# Patient Record
Sex: MALE | Race: WHITE | Employment: OTHER | URBAN - METROPOLITAN AREA
[De-identification: names, ages, dates, MRNs, and addresses within clinical notes are randomized per-mention and may not be internally consistent; named-entity substitution may affect disease eponyms.]

---

## 2017-08-15 ENCOUNTER — OFFICE VISIT (OUTPATIENT)
Dept: FAMILY MEDICINE | Facility: CLINIC | Age: 42
End: 2017-08-15
Payer: COMMERCIAL

## 2017-08-15 VITALS
DIASTOLIC BLOOD PRESSURE: 74 MMHG | OXYGEN SATURATION: 99 % | BODY MASS INDEX: 27.77 KG/M2 | WEIGHT: 205 LBS | SYSTOLIC BLOOD PRESSURE: 130 MMHG | HEART RATE: 120 BPM | TEMPERATURE: 97.6 F | RESPIRATION RATE: 20 BRPM | HEIGHT: 72 IN

## 2017-08-15 DIAGNOSIS — Z23 NEED FOR PROPHYLACTIC VACCINATION WITH TETANUS-DIPHTHERIA (TD): ICD-10-CM

## 2017-08-15 DIAGNOSIS — Z11.3 SCREEN FOR STD (SEXUALLY TRANSMITTED DISEASE): ICD-10-CM

## 2017-08-15 DIAGNOSIS — F31.81 BIPOLAR 2 DISORDER (H): ICD-10-CM

## 2017-08-15 DIAGNOSIS — Z00.00 ROUTINE GENERAL MEDICAL EXAMINATION AT A HEALTH CARE FACILITY: Primary | ICD-10-CM

## 2017-08-15 PROCEDURE — 90471 IMMUNIZATION ADMIN: CPT | Performed by: NURSE PRACTITIONER

## 2017-08-15 PROCEDURE — 80061 LIPID PANEL: CPT | Performed by: NURSE PRACTITIONER

## 2017-08-15 PROCEDURE — 36415 COLL VENOUS BLD VENIPUNCTURE: CPT | Performed by: NURSE PRACTITIONER

## 2017-08-15 PROCEDURE — 87591 N.GONORRHOEAE DNA AMP PROB: CPT | Performed by: NURSE PRACTITIONER

## 2017-08-15 PROCEDURE — 86803 HEPATITIS C AB TEST: CPT | Performed by: NURSE PRACTITIONER

## 2017-08-15 PROCEDURE — 90715 TDAP VACCINE 7 YRS/> IM: CPT | Performed by: NURSE PRACTITIONER

## 2017-08-15 PROCEDURE — 99386 PREV VISIT NEW AGE 40-64: CPT | Mod: 25 | Performed by: NURSE PRACTITIONER

## 2017-08-15 PROCEDURE — 87491 CHLMYD TRACH DNA AMP PROBE: CPT | Performed by: NURSE PRACTITIONER

## 2017-08-15 PROCEDURE — 87389 HIV-1 AG W/HIV-1&-2 AB AG IA: CPT | Performed by: NURSE PRACTITIONER

## 2017-08-15 PROCEDURE — 99213 OFFICE O/P EST LOW 20 MIN: CPT | Mod: 25 | Performed by: NURSE PRACTITIONER

## 2017-08-15 PROCEDURE — 80053 COMPREHEN METABOLIC PANEL: CPT | Performed by: NURSE PRACTITIONER

## 2017-08-15 PROCEDURE — 86780 TREPONEMA PALLIDUM: CPT | Performed by: NURSE PRACTITIONER

## 2017-08-15 RX ORDER — LAMOTRIGINE 200 MG/1
300 TABLET ORAL DAILY
Qty: 135 TABLET | Refills: 1 | Status: SHIPPED | OUTPATIENT
Start: 2017-08-15 | End: 2018-04-19

## 2017-08-15 RX ORDER — BUPROPION HYDROCHLORIDE 300 MG/1
TABLET ORAL
Refills: 2 | COMMUNITY
Start: 2017-07-05 | End: 2017-08-15

## 2017-08-15 RX ORDER — BUPROPION HYDROCHLORIDE 300 MG/1
300 TABLET ORAL EVERY MORNING
Qty: 90 TABLET | Refills: 1 | Status: SHIPPED | OUTPATIENT
Start: 2017-08-15 | End: 2018-04-03

## 2017-08-15 NOTE — PROGRESS NOTES
SUBJECTIVE:   CC: Shawn Weiler is an 41 year old male who presents for preventative health visit.     Physical   Annual:     Getting at least 3 servings of Calcium per day::  Yes    Bi-annual eye exam::  NO    Dental care twice a year::  Yes    Sleep apnea or symptoms of sleep apnea::  Excessive snoring    Diet::  Regular (no restrictions)    Taking medications regularly::  Yes    Medication side effects::  None    Additional concerns today::  YES      Today's PHQ-2 Score:   PHQ-2 ( 1999 Pfizer) 8/15/2017   Q1: Little interest or pleasure in doing things 0   Q2: Feeling down, depressed or hopeless 0   PHQ-2 Score 0   Q1: Little interest or pleasure in doing things Not at all   Q2: Feeling down, depressed or hopeless Not at all   PHQ-2 Score 0     Abuse: Current or Past(Physical, Sexual or Emotional)- No  Do you feel safe in your environment - No    Social History   Substance Use Topics     Smoking status: Never Smoker     Smokeless tobacco: Never Used     Alcohol use Yes     The patient does not drink >3 drinks per day nor >7 drinks per week.    Last PSA: No results found for: PSA    Reviewed orders with patient. Reviewed health maintenance and updated orders accordingly - Yes  Labs reviewed in EPIC  BP Readings from Last 3 Encounters:   08/15/17 130/74    Wt Readings from Last 3 Encounters:   08/15/17 205 lb (93 kg)            Patient Active Problem List   Diagnosis     Bipolar 2 disorder (H)     History reviewed. No pertinent surgical history.    Social History   Substance Use Topics     Smoking status: Never Smoker     Smokeless tobacco: Never Used     Alcohol use Yes     History reviewed. No pertinent family history.      Current Outpatient Prescriptions   Medication Sig Dispense Refill     LAMOTRIGINE PO Take 300 mg by mouth       buPROPion (WELLBUTRIN XL) 300 MG 24 hr tablet Take 1 tablet (300 mg) by mouth every morning 90 tablet 1     lamoTRIgine (LAMICTAL) 200 MG tablet Take 1.5 tablets (300 mg) by mouth  "daily 135 tablet 1     No Known Allergies  No lab results found.     Reviewed and updated as needed this visit by clinical staff  Tobacco  Allergies  Med Hx  Surg Hx  Fam Hx  Soc Hx        Reviewed and updated as needed this visit by Provider          Bipolar disorder.  He has been on lamotrigine and wellbutrin for years.  He has been stable for several years.  He has a history of going into manic state with his bipolar disorder. He reports that he is fully aware of uncontrolled bipolar signs and symptoms.  Previously lived in [place], [facility] here.   He is requesting medication refills today.    He is in a monogamous relationship.  He is engaged to be  in February, Cathleen Cox.  He has not been screened for sexually transmitted diseases \"in a very long time.\"  He feels he is low risk for sexually transmitted diseases, but is considering sexually transmitted disease panel for routine screening and peace of mind.    He works in construction, resting.  He has a very active job.  His weight is up slightly, and he knows he could bring this down a little.    ROS:  C: NEGATIVE for fever, chills, change in weight  I: NEGATIVE for worrisome rashes, moles or lesions  E: NEGATIVE for vision changes or irritation  ENT: NEGATIVE for ear, mouth and throat problems  R: NEGATIVE for significant cough or SOB  CV: NEGATIVE for chest pain, palpitations or peripheral edema  GI: NEGATIVE for nausea, abdominal pain, heartburn, or change in bowel habits   male: negative for dysuria, hematuria, decreased urinary stream, erectile dysfunction, urethral discharge  M: NEGATIVE for significant arthralgias or myalgia  N: NEGATIVE for weakness, dizziness or paresthesias  E: NEGATIVE for temperature intolerance, skin/hair changes  P: NEGATIVE for changes in mood or affect    OBJECTIVE:   /74  Pulse 120  Temp 97.6  F (36.4  C) (Oral)  Resp 20  Ht 6' (1.829 m)  Wt 205 lb (93 kg)  SpO2 99%  BMI 27.8 kg/m2    EXAM:  GENERAL: " healthy, alert and no distress  EYES: Eyes grossly normal to inspection, PERRL and conjunctivae and sclerae normal  HENT: ear canals and TM's normal, nose and mouth without ulcers or lesions  NECK: no adenopathy, no asymmetry, masses, or scars and thyroid normal to palpation  RESP: lungs clear to auscultation - no rales, rhonchi or wheezes  CV: regular rate and rhythm, normal S1 S2, no S3 or S4, no murmur, click or rub, no peripheral edema and peripheral pulses strong  ABDOMEN: soft, nontender, no hepatosplenomegaly, no masses and bowel sounds normal   (male): normal male genitalia without lesions or urethral discharge, no hernia  MS: no gross musculoskeletal defects noted, no edema  SKIN: no suspicious lesions or rashes  NEURO: Normal strength and tone, mentation intact and speech normal  PSYCH: mentation appears normal, affect normal/bright    ASSESSMENT/PLAN:   (Z00.00) Routine general medical examination at a health care facility  (primary encounter diagnosis)  Comment: Routine  Plan: Lipid panel reflex to direct LDL, TDAP VACCINE         (ADACEL), VACCINE ADMINISTRATION, INITIAL,         Comprehensive metabolic panel            (F31.81) Bipolar 2 disorder (H)  Comment: Stable  Plan: Comprehensive metabolic panel, buPROPion         (WELLBUTRIN XL) 300 MG 24 hr tablet,         lamoTRIgine (LAMICTAL) 200 MG tablet        Per the patient's stated history, medication history, and his stability at this time, I did go ahead and refill his medications today. I discussed with him that I would be agreeable to continuing his medication refills. I would ask that he comes into the clinic every 6 months to see me for med check appointments. If at any time he develops worsening symptoms of bipolar disorder, I would ask him to follow-up with psychiatry.  The patient denies psychology/counseling necessity or referral today.      (Z23) Need for prophylactic vaccination with tetanus-diphtheria (TD)  Comment:   Plan:  Given    (Z11.3) Screen for STD (sexually transmitted disease)  Comment:   Plan: Chlamydia trachomatis PCR, Neisseria         gonorrhoeae PCR, Anti Treponema, Hepatitis C         antibody, HIV Antigen Antibody Combo        Routine        COUNSELING:   Reviewed preventive health counseling, as reflected in patient instructions       reports that he has never smoked. He has never used smokeless tobacco.      Estimated body mass index is 27.8 kg/(m^2) as calculated from the following:    Height as of this encounter: 6' (1.829 m).    Weight as of this encounter: 205 lb (93 kg).   Weight management plan: Discussed healthy diet and exercise guidelines and patient will follow up in 12 months in clinic to re-evaluate.    Counseling Resources:  ATP IV Guidelines  Pooled Cohorts Equation Calculator  FRAX Risk Assessment  ICSI Preventive Guidelines  Dietary Guidelines for Americans, 2010  USDA's MyPlate  ASA Prophylaxis  Lung CA Screening    ALEYDA Roberts [facility]  Answers for HPI/ROS submitted by the patient on 8/15/2017   PHQ-2 Score: 0

## 2017-08-15 NOTE — MR AVS SNAPSHOT
After Visit Summary   8/15/2017    Shawn Weiler    MRN: [id number]           Patient Information     Date Of Birth          1975        Visit Information        Provider Department      8/15/2017 2:40 PM Gabby Minaya APRN [facility]        Today's Diagnoses     Routine general medical examination at a health care facility    -  1    Bipolar 2 disorder (H)        Need for prophylactic vaccination with tetanus-diphtheria (TD)        Screen for STD (sexually transmitted disease)          Care Instructions      Preventive Health Recommendations  Male Ages 40 to 49    Yearly exam:             See your health care provider every year in order to  o   Review health changes.   o   Discuss preventive care.    o   Review your medicines if your doctor has prescribed any.    You should be tested each year for STDs (sexually transmitted diseases) if you re at risk.     Have a cholesterol test every 5 years.     Have a colonoscopy (test for colon cancer) if someone in your family has had colon cancer or polyps before age 50.     After age 45, have a diabetes test (fasting glucose). If you are at risk for diabetes, you should have this test every 3 years.      Talk with your health care provider about whether or not a prostate cancer screening test (PSA) is right for you.    Shots: Get a flu shot each year. Get a tetanus shot every 10 years.     Nutrition:    Eat at least 5 servings of fruits and vegetables daily.     Eat whole-grain bread, whole-wheat pasta and brown rice instead of white grains and rice.     Talk to your provider about Calcium and Vitamin D.     Lifestyle    Exercise for at least 150 minutes a week (30 minutes a day, 5 days a week). This will help you control your weight and prevent disease.     Limit alcohol to one drink per day.     No smoking.     Wear sunscreen to prevent skin cancer.     See your dentist every six months for an exam and cleaning.     "          Follow-ups after your visit        Who to contact     If you have questions or need follow up information about today's clinic visit or your schedule please contact [facility] directly at [phone].  Normal or non-critical lab and imaging results will be communicated to you by MyChart, letter or phone within 4 business days after the clinic has received the results. If you do not hear from us within 7 days, please contact the clinic through MyChart or phone. If you have a critical or abnormal lab result, we will notify you by phone as soon as possible.  Submit refill requests through [employer] or call your pharmacy and they will forward the refill request to us. Please allow 3 business days for your refill to be completed.          Additional Information About Your Visit        [employer] Information     [employer] lets you send messages to your doctor, view your test results, renew your prescriptions, schedule appointments and more. To sign up, go to [email or web address] . Click on \"Log in\" on the left side of the screen, which will take you to the Welcome page. Then click on \"Sign up Now\" on the right side of the page.     You will be asked to enter the access code listed below, as well as some personal information. Please follow the directions to create your username and password.     Your access code is: [email or web address]  Expires: 2017  2:49 PM     Your access code will  in 90 days. If you need help or a new code, please call your [facility] clinic or [phone].        Care EveryWhere ID     This is your Care EveryWhere ID. This could be used by other organizations to access your [facility] medical records  [id number]        Your Vitals Were     Pulse Temperature Respirations Height Pulse Oximetry BMI (Body Mass Index)    120 97.6  F (36.4  C) (Oral) 20 6' (1.829 m) 99% 27.8 kg/m2       Blood Pressure from Last 3 Encounters:   08/15/17 130/74    Weight from Last 3 Encounters: "   08/15/17 205 lb (93 kg)              We Performed the Following     Anti Treponema     Chlamydia trachomatis PCR     Comprehensive metabolic panel     Hepatitis C antibody     HIV Antigen Antibody Combo     Lipid panel reflex to direct LDL     Neisseria gonorrhoeae PCR     TDAP VACCINE (ADACEL)     VACCINE ADMINISTRATION, INITIAL          Today's Medication Changes          These changes are accurate as of: 8/15/17  2:49 PM.  If you have any questions, ask your nurse or doctor.               These medicines have changed or have updated prescriptions.        Dose/Directions    buPROPion 300 MG 24 hr tablet   Commonly known as:  WELLBUTRIN XL   This may have changed:  See the new instructions.   Used for:  Bipolar 2 disorder (H)   Changed by:  Gabby Minaya APRN CNP        Dose:  300 mg   Take 1 tablet (300 mg) by mouth every morning   Quantity:  90 tablet   Refills:  1       * LAMOTRIGINE PO   This may have changed:  Another medication with the same name was added. Make sure you understand how and when to take each.   Changed by:  Gabby Minaya APRN CNP        Dose:  300 mg   Take 300 mg by mouth   Refills:  0       * lamoTRIgine 200 MG tablet   Commonly known as:  LaMICtal   This may have changed:  You were already taking a medication with the same name, and this prescription was added. Make sure you understand how and when to take each.   Used for:  Bipolar 2 disorder (H)   Changed by:  Gabby Minaya APRN CNP        Dose:  300 mg   Take 1.5 tablets (300 mg) by mouth daily   Quantity:  135 tablet   Refills:  1       * Notice:  This list has 2 medication(s) that are the same as other medications prescribed for you. Read the directions carefully, and ask your doctor or other care provider to review them with you.         Where to get your medicines      These medications were sent to [facility] [id number] [place] [address] AT [facility] [address]  [facility] [facility] [address],  [place] [address]     Phone:  [phone]     buPROPion 300 MG 24 hr tablet    lamoTRIgine 200 MG tablet                Primary Care Provider    None Specified       No primary provider on file.        Equal Access to Services     ELOISA GARCIA : Hadakni wilfrid londono lindao Cedric, wavishalda luqadaha, qaybta kaalmada adebhavani, carlos diazsherrill brown. So [facility] [phone].    ATENCIÓN: Si habla español, tiene a nice disposición servicios gratuitos de asistencia lingüística. Brenda al [phone].    We comply with applicable federal civil rights laws and [place] laws. We do not discriminate on the basis of race, color, national origin, age, disability sex, sexual orientation or gender identity.            Thank you!     Thank you for choosing [facility]  for your care. Our goal is always to provide you with excellent care. Hearing back from our patients is one way we can continue to improve our services. Please take a few minutes to complete the written survey that you may receive in the mail after your visit with us. Thank you!             Your Updated Medication List - Protect others around you: Learn how to safely use, store and throw away your medicines at www.disposemymeds.org.          This list is accurate as of: 8/15/17  2:49 PM.  Always use your most recent med list.                   Brand Name Dispense Instructions for use Diagnosis    buPROPion 300 MG 24 hr tablet    WELLBUTRIN XL    90 tablet    Take 1 tablet (300 mg) by mouth every morning    Bipolar 2 disorder (H)       * LAMOTRIGINE PO      Take 300 mg by mouth        * lamoTRIgine 200 MG tablet    LaMICtal    135 tablet    Take 1.5 tablets (300 mg) by mouth daily    Bipolar 2 disorder (H)       * Notice:  This list has 2 medication(s) that are the same as other medications prescribed for you. Read the directions carefully, and ask your doctor or other care provider to review them with you.

## 2017-08-15 NOTE — LETTER
Carlos Dyerler  [address] [address]        August 21, 2017          Dear Mr.Weiler,    We are writing to inform you of your test results.    This note is to let you know the results of your recent lab studies.    Your comprehensive STD (sexually transmitted diseases) panel is negative.   There is no sign of HIV, hepatitis, syphilis, gonorrhea, or chlamydia.     Your kidney function, liver function, electrolytes, blood sugar, and calcium levels are all normal.    Your cholesterol levels are mildly elevated. I recommend a diet low in fat and cholesterol as well as regular aerobic activity. I would like to recheck your cholesterol in one year.    Let me know if you have any questions. Best of luck to you with your upcoming wedding!    Resulted Orders   Lipid panel reflex to direct LDL   Result Value Ref Range    Cholesterol 208 (H) <200 mg/dL      Comment:      Desirable:       <200 mg/dl    Triglycerides 230 (H) <150 mg/dL      Comment:      Borderline high:  150-199 mg/dl  High:             200-499 mg/dl  Very high:       >499 mg/dl      HDL Cholesterol 61 >39 mg/dL    LDL Cholesterol Calculated 101 (H) <100 mg/dL      Comment:      Above desirable:  100-129 mg/dl  Borderline High:  130-159 mg/dL  High:             160-189 mg/dL  Very high:       >189 mg/dl      Non HDL Cholesterol 147 (H) <130 mg/dL      Comment:      Above Desirable:  130-159 mg/dl  Borderline high:  160-189 mg/dl  High:             190-219 mg/dl  Very high:       >219 mg/dl     Comprehensive metabolic panel   Result Value Ref Range    Sodium 137 133 - 144 mmol/L    Potassium 4.0 3.4 - 5.3 mmol/L    Chloride 104 94 - 109 mmol/L    Carbon Dioxide 26 20 - 32 mmol/L    Anion Gap 7 3 - 14 mmol/L    Glucose 93 70 - 99 mg/dL    Urea Nitrogen 17 7 - 30 mg/dL    Creatinine 0.96 0.66 - 1.25 mg/dL    GFR Estimate 86 >60 mL/min/1.7m2      Comment:      Non  GFR Calc    GFR Estimate If Black >90 >60 mL/min/1.7m2       Comment:       GFR Calc    Calcium 9.3 8.5 - 10.1 mg/dL    Bilirubin Total 0.3 0.2 - 1.3 mg/dL    Albumin 4.2 3.4 - 5.0 g/dL    Protein Total 7.1 6.8 - 8.8 g/dL    Alkaline Phosphatase 74 40 - 150 U/L    ALT 36 0 - 70 U/L    AST 16 0 - 45 U/L   Chlamydia trachomatis PCR   Result Value Ref Range    Specimen Description Urine     Chlamydia Trachomatis PCR Negative NEG^Negative      Comment:      Negative for C. trachomatis rRNA by transcription mediated amplification.  A negative result by transcription mediated amplification does not preclude   the presence of C. trachomatis infection because results are dependent on   proper and adequate collection, absence of inhibitors, and sufficient rRNA to   be detected.     Neisseria gonorrhoeae PCR   Result Value Ref Range    Specimen Descrip Urine     N Gonorrhea PCR Negative NEG^Negative      Comment:      Negative for N. gonorrhoeae rRNA by transcription mediated amplification.  A negative result by transcription mediated amplification does not preclude   the presence of N. gonorrhoeae infection because results are dependent on   proper and adequate collection, absence of inhibitors, and sufficient rRNA to   be detected.     Anti Treponema   Result Value Ref Range    Treponema pallidum Antibody Negative NEG^Negative   Hepatitis C antibody   Result Value Ref Range    Hepatitis C Antibody Nonreactive NR^Nonreactive      Comment:      Assay performance characteristics have not been established for newborns,   infants, and children     HIV Antigen Antibody Combo   Result Value Ref Range    HIV Antigen Antibody Combo Nonreactive NR^Nonreactive          Comment:      HIV-1 p24 Ag & HIV-1/HIV-2 Ab Not Detected       If you have any questions or concerns, please call the clinic at the number listed above.       Sincerely,        ALEYDA Roberts CNP/nr

## 2017-08-15 NOTE — NURSING NOTE
Chief Complaint   Patient presents with     Physical     Establish Care       Initial /74  Pulse 120  Temp 97.6  F (36.4  C) (Oral)  Resp 20  Ht 6' (1.829 m)  Wt 205 lb (93 kg)  SpO2 99%  BMI 27.8 kg/m2 Estimated body mass index is 27.8 kg/(m^2) as calculated from the following:    Height as of this encounter: 6' (1.829 m).    Weight as of this encounter: 205 lb (93 kg).  Medication Reconciliation: complete       Campos Coe MA

## 2017-08-15 NOTE — Clinical Note
Cassie, Can Icharge an extra visit for bipolar disorder management since he is a new patient and I prescribed his medications? Typically at a physical, if I renew medications, I do not charge extra. Thank you.

## 2017-08-16 LAB
ALBUMIN SERPL-MCNC: 4.2 G/DL (ref 3.4–5)
ALP SERPL-CCNC: 74 U/L (ref 40–150)
ALT SERPL W P-5'-P-CCNC: 36 U/L (ref 0–70)
ANION GAP SERPL CALCULATED.3IONS-SCNC: 7 MMOL/L (ref 3–14)
AST SERPL W P-5'-P-CCNC: 16 U/L (ref 0–45)
BILIRUB SERPL-MCNC: 0.3 MG/DL (ref 0.2–1.3)
BUN SERPL-MCNC: 17 MG/DL (ref 7–30)
CALCIUM SERPL-MCNC: 9.3 MG/DL (ref 8.5–10.1)
CHLORIDE SERPL-SCNC: 104 MMOL/L (ref 94–109)
CHOLEST SERPL-MCNC: 208 MG/DL
CO2 SERPL-SCNC: 26 MMOL/L (ref 20–32)
CREAT SERPL-MCNC: 0.96 MG/DL (ref 0.66–1.25)
GFR SERPL CREATININE-BSD FRML MDRD: 86 ML/MIN/1.7M2
GLUCOSE SERPL-MCNC: 93 MG/DL (ref 70–99)
HCV AB SERPL QL IA: NONREACTIVE
HDLC SERPL-MCNC: 61 MG/DL
HIV 1+2 AB+HIV1 P24 AG SERPL QL IA: NONREACTIVE
LDLC SERPL CALC-MCNC: 101 MG/DL
NONHDLC SERPL-MCNC: 147 MG/DL
POTASSIUM SERPL-SCNC: 4 MMOL/L (ref 3.4–5.3)
PROT SERPL-MCNC: 7.1 G/DL (ref 6.8–8.8)
SODIUM SERPL-SCNC: 137 MMOL/L (ref 133–144)
T PALLIDUM IGG+IGM SER QL: NEGATIVE
TRIGL SERPL-MCNC: 230 MG/DL

## 2017-08-17 LAB
C TRACH DNA SPEC QL NAA+PROBE: NEGATIVE
N GONORRHOEA DNA SPEC QL NAA+PROBE: NEGATIVE
SPECIMEN SOURCE: NORMAL
SPECIMEN SOURCE: NORMAL

## 2018-04-19 ENCOUNTER — OFFICE VISIT (OUTPATIENT)
Dept: FAMILY MEDICINE | Facility: CLINIC | Age: 43
End: 2018-04-19
Payer: COMMERCIAL

## 2018-04-19 VITALS
OXYGEN SATURATION: 100 % | DIASTOLIC BLOOD PRESSURE: 77 MMHG | BODY MASS INDEX: 29.39 KG/M2 | RESPIRATION RATE: 20 BRPM | HEART RATE: 66 BPM | TEMPERATURE: 97.9 F | WEIGHT: 217 LBS | HEIGHT: 72 IN | SYSTOLIC BLOOD PRESSURE: 129 MMHG

## 2018-04-19 DIAGNOSIS — D17.30 LIPOMA OF SKIN AND SUBCUTANEOUS TISSUE: ICD-10-CM

## 2018-04-19 DIAGNOSIS — F31.81 BIPOLAR 2 DISORDER (H): Primary | ICD-10-CM

## 2018-04-19 DIAGNOSIS — Z87.09 HISTORY OF DEVIATED NASAL SEPTUM: ICD-10-CM

## 2018-04-19 PROCEDURE — 99214 OFFICE O/P EST MOD 30 MIN: CPT | Performed by: NURSE PRACTITIONER

## 2018-04-19 RX ORDER — BUPROPION HYDROCHLORIDE 300 MG/1
300 TABLET ORAL EVERY MORNING
Qty: 90 TABLET | Refills: 3 | Status: SHIPPED | OUTPATIENT
Start: 2018-04-19 | End: 2018-12-11

## 2018-04-19 RX ORDER — LAMOTRIGINE 200 MG/1
300 TABLET ORAL DAILY
Qty: 135 TABLET | Refills: 3 | Status: SHIPPED | OUTPATIENT
Start: 2018-04-19 | End: 2018-12-11

## 2018-04-19 NOTE — MR AVS SNAPSHOT
After Visit Summary   4/19/2018    Shawn Weiler    MRN: [id number]           Patient Information     Date Of Birth          1975        Visit Information        Provider Department      4/19/2018 10:40 AM Gabby Minaya APRN CNP [facility]        Today's Diagnoses     Bipolar 2 disorder (H)    -  1    History of deviated nasal septum        Lipoma of skin and subcutaneous tissue           Follow-ups after your visit        Additional Services     GENERAL SURG ADULT REFERRAL       Your provider has referred you to: FMG: [facility] [place] [phone]   http://www.[place].[facility]/Clinics/SurgicalConsultants  UM: [facility] [phone]   http://www.[facility].org/Clinics/surgery-clinic-[place]/    Please be aware that coverage of these services is subject to the terms and limitations of your health insurance plan.  Call member services at your health plan with any benefit or coverage questions.      Please bring the following with you to your appointment:    (1) Any X-Rays, CTs or MRIs which have been performed.  Contact the facility where they were done to arrange for  prior to your scheduled appointment.   (2) List of current medications   (3) This referral request   (4) Any documents/labs given to you for this referral            OTOLARYNGOLOGY REFERRAL       Your provider has referred you to:   FMG: [facility] [phone]  http://www.[place].[facility]/[place]/  FMG: [facility] [phone]   http://www.[place].org/[facility]/  FMG: [facility] [phone]   http://www.[place].org/[facility]/[place]/  FMG: [facility] [phone]   http://www.[place].org/[facility]/[place]/  FMG: [facility] [place] [phone]    http://www.[place].org/[facility]/[place]/  FMG: [facility] [place] [phone]   http://www.[email or web address]/Clinics/[place]/    UMP: [facility]) [facility] [phone]  http://www.[facility].org/Clinics/ear-nose-and-throat-clinic/  UMP: [facility] [phone]   http://www.[facility].org/Clinics/[facility]/  UMP: [facility] [phone]   http://www.[facility]/Clinics/[facility]/index.htm    FHN:   [facility] [phone]   http://www.entsc.com/locations.cf/lid:317/[place]/  [place] [phone]   http://www.entsc.com/locations.cf/lid:463/[facility]/  [place] [phone]   http://www.entsc.com/locations.cf/lid:312/[place]/  [place] [phone]   http://www.entsc.com/locations.cf/lid:322/[place]/  [place] [phone]   http://www.entsc.com/locations.cf/lid:313/.%20[place]/    Please be aware that coverage of these services is subject to the terms and limitations of your health insurance plan.  Call member services at your health plan with any benefit or coverage questions.      Please bring the following with you to your appointment:    (1) Any X-Rays, CTs or MRIs which have been performed.  Contact the facility where they were done to arrange for  prior to your scheduled appointment.   (2) List of current medications  (3) This referral request   (4) Any documents/labs given to you for this referral                  Who to contact     If you have questions or need follow up information about today's clinic visit or your schedule please contact [facility] directly at [phone].  Normal or non-critical lab and imaging results will be communicated to you by MyChart, letter or  "phone within 4 business days after the clinic has received the results. If you do not hear from us within 7 days, please contact the clinic through Squrl or phone. If you have a critical or abnormal lab result, we will notify you by phone as soon as possible.  Submit refill requests through Squrl or call your pharmacy and they will forward the refill request to us. Please allow 3 business days for your refill to be completed.          Additional Information About Your Visit        Squrl Information     Squrl lets you send messages to your doctor, view your test results, renew your prescriptions, schedule appointments and more. To sign up, go to [email or web address] . Click on \"Log in\" on the left side of the screen, which will take you to the Welcome page. Then click on \"Sign up Now\" on the right side of the page.     You will be asked to enter the access code listed below, as well as some personal information. Please follow the directions to create your username and password.     Your access code is: [email or web address]  Expires: 2018 11:01 AM     Your access code will  in 90 days. If you need help or a new code, please call your [facility] clinic or [phone].        Care EveryWhere ID     This is your Care EveryWhere ID. This could be used by other organizations to access your [facility] medical records  [id number]        Your Vitals Were     Pulse Temperature Respirations Height Pulse Oximetry BMI (Body Mass Index)    66 97.9  F (36.6  C) (Oral) 20 6' (1.829 m) 100% 29.43 kg/m2       Blood Pressure from Last 3 Encounters:   18 129/77   08/15/17 130/74    Weight from Last 3 Encounters:   18 217 lb (98.4 kg)   08/15/17 205 lb (93 kg)              We Performed the Following     GENERAL SURG ADULT REFERRAL     OTOLARYNGOLOGY REFERRAL          Today's Medication Changes          These changes are accurate as of 18 11:01 AM.  If you have any questions, ask your nurse or doctor.       "         These medicines have changed or have updated prescriptions.        Dose/Directions    buPROPion 300 MG 24 hr tablet   Commonly known as:  WELLBUTRIN XL   This may have changed:  See the new instructions.   Used for:  Bipolar 2 disorder (H)   Changed by:  Gabby Minaya APRN CNP        Dose:  300 mg   Take 1 tablet (300 mg) by mouth every morning   Quantity:  90 tablet   Refills:  3            Where to get your medicines      These medications were sent to [facility] - [address] [address]    Hours:  Mon-Fri 8:30am-5:00pm Toll Free [phone] Phone:  [phone]     buPROPion 300 MG 24 hr tablet    lamoTRIgine 200 MG tablet                Primary Care Provider Fax #    Physician No Ref-Primary [phone]       No address on file        Equal Access to Services     [facility]: Galo Steele, waroberth campoverde, qayael kaalmamanju gonzalez, carlos lyon . So [facility] [phone].    ATENCIÓN: Si habla español, tiene a nice disposición servicios gratuitos de asistencia lingüística. Brenda al [phone].    We comply with applicable federal civil rights laws and [place] laws. We do not discriminate on the basis of race, color, national origin, age, disability, sex, sexual orientation, or gender identity.            Thank you!     Thank you for choosing [facility]  for your care. Our goal is always to provide you with excellent care. Hearing back from our patients is one way we can continue to improve our services. Please take a few minutes to complete the written survey that you may receive in the mail after your visit with us. Thank you!             Your Updated Medication List - Protect others around you: Learn how to safely use, store and throw away your medicines at www.disposemymeds.org.          This list is accurate as of 4/19/18 11:01 AM.   Always use your most recent med list.                   Brand Name Dispense Instructions for use Diagnosis    buPROPion 300 MG 24 hr tablet    WELLBUTRIN XL    90 tablet    Take 1 tablet (300 mg) by mouth every morning    Bipolar 2 disorder (H)       * LAMOTRIGINE PO      Take 300 mg by mouth        * lamoTRIgine 200 MG tablet    LaMICtal    135 tablet    Take 1.5 tablets (300 mg) by mouth daily    Bipolar 2 disorder (H)       * Notice:  This list has 2 medication(s) that are the same as other medications prescribed for you. Read the directions carefully, and ask your doctor or other care provider to review them with you.

## 2018-04-19 NOTE — PROGRESS NOTES
SUBJECTIVE:   Shawn Weiler is a 42 year old male who presents to clinic today for the following health issues:      History of Present Illness     Depression & Anxiety Follow-up:     Depression/Anxiety:  Depression only    Status since last visit::  Stable    Other associated symptoms of depression::  None    Significant life event::  No    Current substance use::  None    Anxiety/Panic symptoms::  No       Today's PHQ-9         PHQ-9 Total Score:         PHQ-9 Q9 Suicidal ideation:       Thoughts of suicide or self harm:      Self-harm Plan:        Self-harm Action:          Safety concerns for self or others:          his mental health has been stable for 10 years on his lamictal and wellbutrin combination.  He does not currently go to psychiatry or psychology.  He takes care of himself.  Healthy relationship.  Recently  to Cathleen Cox.  No side effects or problems.  He is requesting refills today.       Deviated septum.  Diagnosed years ago.  He can only breathe through one nostril.  He is requesting a referral to ENT for a consultation and possible surgery.  His deviated septum does add to his snoring.  He does not want a sleep study yet, rather he wants to start with the devited septum  Treatment.    Lipomas.  Of upper extremities and one on his right lower back.  These seem to be getting a little larger.  One in particular on his right arm has become more painful.  He is requesting a referral for excision.    Problem list and histories reviewed & adjusted, as indicated.  Additional history: as documented      Patient Active Problem List   Diagnosis     Bipolar 2 disorder (H)     History reviewed. No pertinent surgical history.    Social History   Substance Use Topics     Smoking status: Never Smoker     Smokeless tobacco: Never Used     Alcohol use Yes     History reviewed. No pertinent family history.      Current Outpatient Prescriptions   Medication Sig Dispense Refill     buPROPion (WELLBUTRIN XL)  300 MG 24 hr tablet TAKE ONE TABLET BY MOUTH EVERY MORNING 14 tablet 0     lamoTRIgine (LAMICTAL) 200 MG tablet Take 1.5 tablets (300 mg) by mouth daily 135 tablet 1     LAMOTRIGINE PO Take 300 mg by mouth       No Known Allergies  Recent Labs   Lab Test  08/15/17   1502   LDL  101*   HDL  61   TRIG  230*   ALT  36   CR  0.96   GFRESTIMATED  86   GFRESTBLACK  >90   POTASSIUM  4.0      BP Readings from Last 3 Encounters:   04/19/18 129/77   08/15/17 130/74    Wt Readings from Last 3 Encounters:   04/19/18 217 lb (98.4 kg)   08/15/17 205 lb (93 kg)           Labs reviewed in EPIC    ROS:  Constitutional, HEENT, cardiovascular, pulmonary, GI, , musculoskeletal, neuro, skin, endocrine and psych systems are negative, except as otherwise noted.    OBJECTIVE:     /77  Pulse 66  Temp 97.9  F (36.6  C) (Oral)  Resp 20  Ht 6' (1.829 m)  Wt 217 lb (98.4 kg)  SpO2 100%  BMI 29.43 kg/m2  Body mass index is 29.43 kg/(m^2).  GENERAL APPEARANCE: healthy, alert and no distress. Smiling.   SKIN: warm and dry.  He has several scattered subcutaneous nodules on upper extremities consistent with lipomas.   PSYCH: mentation appears normal and affect normal/bright.  Good eye contact.    ASSESSMENT/PLAN:     (F31.81) Bipolar 2 disorder (H)  (primary encounter diagnosis)  Comment: stable  Plan: buPROPion (WELLBUTRIN XL) 300 MG 24 hr tablet,         lamoTRIgine (LAMICTAL) 200 MG tablet          I did go ahead and refill arias's medications today.  He has been very stable on this regimen.  He will return to the clinic in 6 months for a face-to-face appointment to repeat his PHQ-9.  He is to continue to take good care of himself.  He is to follow up with me sooner if his bipolar status changes in any way.    (Z87.09) History of deviated nasal septum  Comment: history of  Plan: OTOLARYNGOLOGY REFERRAL        He will start with ENT.  If ENT treatment does not improve his snoring, he will follow up with a sleep study/referral.  We  will talk about this again at upcoming appointments.     (D17.30) Lipoma of skin and subcutaneous tissue  Comment: history of   Plan: GENERAL SURG ADULT REFERRAL        Follow up with surgery for consultation and treatment options for lipomas.         ALEYDA Roberts [facility]

## 2018-04-20 ASSESSMENT — PATIENT HEALTH QUESTIONNAIRE - PHQ9: SUM OF ALL RESPONSES TO PHQ QUESTIONS 1-9: 0

## 2018-04-25 ENCOUNTER — TELEPHONE (OUTPATIENT)
Dept: SURGERY | Facility: CLINIC | Age: 43
End: 2018-04-25

## 2018-04-25 ENCOUNTER — OFFICE VISIT (OUTPATIENT)
Dept: SURGERY | Facility: CLINIC | Age: 43
End: 2018-04-25
Payer: COMMERCIAL

## 2018-04-25 VITALS
DIASTOLIC BLOOD PRESSURE: 70 MMHG | HEART RATE: 76 BPM | BODY MASS INDEX: 29.39 KG/M2 | HEIGHT: 72 IN | WEIGHT: 217 LBS | SYSTOLIC BLOOD PRESSURE: 120 MMHG

## 2018-04-25 DIAGNOSIS — D17.1 BENIGN LIPOMATOUS NEOPLASM OF SKIN AND SUBCUTANEOUS TISSUE OF TRUNK: Primary | ICD-10-CM

## 2018-04-25 PROCEDURE — 99243 OFF/OP CNSLTJ NEW/EST LOW 30: CPT | Performed by: SURGERY

## 2018-04-25 NOTE — TELEPHONE ENCOUNTER
Type of surgery: Excision arm lipomas  Location of surgery: [facility]  Date and time of surgery: 5/8/18 at 3pm  Surgeon: Dr. Joshua Rodriguez  Pre-Op Appt Date: Patient to schedule  Post-Op Appt Date: Patient to schedule   Packet sent out: Yes  Pre-cert/Authorization completed:  Not Applicable  Date: 4/25/18

## 2018-04-25 NOTE — LETTER
2018    Re: Shawn Weiler, : 1975    [facility]  Surgery Consultation     CONSULTATION REQUESTED BY:  Gabby Minaya [phone]     HPI: Patient is a 42-year-old gentleman referred by the above-mentioned primary care provider for consultation regarding lipomas.  He reports that he has had subcutaneous nodules on both upper extremities as well as his left low back for some time.  The ones on his arm in particular causes intermittent discomfort with direct pressure when they get bumped while working.  He states that they have not grown significantly over time.     PMH:   has no past medical history on file.  PSH:    has no past surgical history on file.  Social History:   reports that he has never smoked. He has never used smokeless tobacco. He reports that he drinks alcohol. He reports that he does not use illicit drugs.  Family History:   family history is not on file.  Medications/Allergies: Home medications and allergies reviewed.     ROS:  The 10 point Review of Systems is negative other than noted in the HPI.     Physical Exam:  /70  Pulse 76  Ht 6' (1.829 m)  Wt 217 lb (98.4 kg)  BMI 29.43 kg/m2  GENERAL: Generally appears well.  Psych: Alert and Oriented.  Normal affect  Eyes: Sclera clear  Respiratory:  Lungs clear to ausculation bilaterally with good air excursion  Cardiovascular:  Regular Rate and Rhythm with no murmurs gallops or rubs, normal peripheral pulses  Integumentary:  No rashes, in both forearms he has well-circumscribed lipomas approximately 1 cm in greatest diameter.  Similarly a 1 cm lipoma in the subcutaneous tissues of his left back.  These are somewhat tender to palpation.  Neurological: grossly intact     All new lab and imaging data was reviewed.      Impression and Plan:  Patient is a 42 year old male with lipomas as described     PLAN: Options were discussed.  Patient wishes to have the ones on his arms removed as they cause him  difficulties.  These should be easily amenable to excision under local anesthetic.  This will be scheduled at his convenience.     Joshua Rodriguez MD

## 2018-04-26 NOTE — PROGRESS NOTES
[facility]  Surgery Consultation    CONSULTATION REQUESTED BY:  Gabby Minaya [phone]    HPI: Patient is a 42-year-old gentleman referred by the above-mentioned primary care provider for consultation regarding lipomas.  He reports that he has had subcutaneous nodules on both upper extremities as well as his left low back for some time.  The ones on his arm in particular causes intermittent discomfort with direct pressure when they get bumped while working.  He states that they have not grown significantly over time.    PMH:   has no past medical history on file.  PSH:    has no past surgical history on file.  Social History:   reports that he has never smoked. He has never used smokeless tobacco. He reports that he drinks alcohol. He reports that he does not use illicit drugs.  Family History:   family history is not on file.  Medications/Allergies: Home medications and allergies reviewed.    ROS:  The 10 point Review of Systems is negative other than noted in the HPI.    Physical Exam:  /70  Pulse 76  Ht 6' (1.829 m)  Wt 217 lb (98.4 kg)  BMI 29.43 kg/m2  GENERAL: Generally appears well.  Psych: Alert and Oriented.  Normal affect  Eyes: Sclera clear  Respiratory:  Lungs clear to ausculation bilaterally with good air excursion  Cardiovascular:  Regular Rate and Rhythm with no murmurs gallops or rubs, normal peripheral pulses  Integumentary:  No rashes, in both forearms he has well-circumscribed lipomas approximately 1 cm in greatest diameter.  Similarly a 1 cm lipoma in the subcutaneous tissues of his left back.  These are somewhat tender to palpation.  Neurological: grossly intact    All new lab and imaging data was reviewed.     Impression and Plan:  Patient is a 42 year old male with lipomas as described    PLAN: Options were discussed.  Patient wishes to have the ones on his arms removed as they cause him difficulties.  These should be easily amenable to excision under  local anesthetic.  This will be scheduled at his convenience.    Joshua Rodriguez MD    Please route or send letter to:  Primary Care Provider (PCP) and Referring Provider

## 2018-05-07 RX ORDER — LIDOCAINE HYDROCHLORIDE 10 MG/ML
10 INJECTION, SOLUTION EPIDURAL; INFILTRATION; INTRACAUDAL; PERINEURAL ONCE
Status: CANCELLED | OUTPATIENT
Start: 2018-05-07

## 2018-05-08 ENCOUNTER — OFFICE VISIT (OUTPATIENT)
Dept: SURGERY | Facility: PHYSICIAN GROUP | Age: 43
End: 2018-05-08
Payer: COMMERCIAL

## 2018-05-08 ENCOUNTER — SURGERY (OUTPATIENT)
Age: 43
End: 2018-05-08

## 2018-05-08 ENCOUNTER — HOSPITAL ENCOUNTER (OUTPATIENT)
Facility: CLINIC | Age: 43
Discharge: HOME | End: 2018-05-08
Attending: SURGERY | Admitting: SURGERY
Payer: COMMERCIAL

## 2018-05-08 VITALS — SYSTOLIC BLOOD PRESSURE: 103 MMHG | DIASTOLIC BLOOD PRESSURE: 82 MMHG | RESPIRATION RATE: 16 BRPM

## 2018-05-08 PROCEDURE — 11401 EXC TR-EXT B9+MARG 0.6-1 CM: CPT | Mod: 59 | Performed by: SURGERY

## 2018-05-08 PROCEDURE — 25075 EXC FOREARM LES SC < 3 CM: CPT | Mod: 59 | Performed by: SURGERY

## 2018-05-08 PROCEDURE — 11400 EXC TR-EXT B9+MARG 0.5 CM<: CPT | Performed by: SURGERY

## 2018-05-08 PROCEDURE — 12032 INTMD RPR S/A/T/EXT 2.6-7.5: CPT

## 2018-05-08 PROCEDURE — 11401 EXC TR-EXT B9+MARG 0.6-1 CM: CPT | Performed by: SURGERY

## 2018-05-08 PROCEDURE — 11401 EXC TR-EXT B9+MARG 0.6-1 CM: CPT | Mod: 59

## 2018-05-08 NOTE — OP NOTE
Preoperative diagnosis: #1 subcutaneous lipoma left antecubital fossa, #2 inclusion cyst left wrist, #3 subcutaneous lipoma right forearm    Postoperative diagnosis: Same    Procedure: Excision of above all lesions 1 cm diameter    Surgeon: Joshua Rodriguez MD    Anesthesia: Local    Estimated blood loss: 5 cc    Specimens: Lipoma, inclusion cyst, lipoma all discarded with patient consent    Indication for procedure: This is a 42-year-old gentleman who presented my office with the above-mentioned subcutaneous abnormalities.  We discussed therapeutic options.  It is elected to proceed with excision.  The potential risks of bleeding, infection, recurrence were reviewed.  His questions were answered and he wished to proceed.    Procedure: After informed consent was obtained the patient was taken to the procedure area in the endoscopy suites at [facility].  His left arm was placed on a Ramires stand.  The areas in question were then prepped and draped in usual manner.  1% lidocaine with epinephrine was used to create a field block overlying both abnormalities.  Starting in the antecubital fossa transverse incision was made overlying the palpable subcutaneous lipoma.  A benign well-circumscribed lipoma was delivered.  The incision was then closed with interrupted deep dermal 3-0 Monocryl sutures.  Mastisol and Steri-Strips were applied.  The inclusion cyst of the left wrist was then injected with local anesthetic.  Elliptical incision was made around and including the skin associated with this abnormality.  This was excised to normal-appearing subcutaneous tissues.  This was removed completely and intact.  Incision was closed with interrupted deep dermal 3-0 Monocryl sutures.  Mastisol and Steri-Strips were applied.  Attention was then turned to the contralateral forearm.  The palpable abnormality was prepped and draped in usual manner.  Local anesthetic was injected.  Transverse incision was made in a  benign-appearing lipoma was delivered from the subcutaneous tissues.  The incision was closed with interrupted deep dermal 3-0 Monocryl sutures.  Mastisol and Steri-Strips were applied.  Patient tolerated this without difficulty.  He left in a stable and ambulatory condition.    Joshua Rodriguez MD

## 2018-12-11 ENCOUNTER — OFFICE VISIT (OUTPATIENT)
Dept: FAMILY MEDICINE | Facility: CLINIC | Age: 43
End: 2018-12-11
Payer: COMMERCIAL

## 2018-12-11 VITALS
RESPIRATION RATE: 20 BRPM | TEMPERATURE: 98.1 F | OXYGEN SATURATION: 98 % | DIASTOLIC BLOOD PRESSURE: 68 MMHG | SYSTOLIC BLOOD PRESSURE: 112 MMHG | WEIGHT: 219 LBS | HEIGHT: 72 IN | HEART RATE: 72 BPM | BODY MASS INDEX: 29.66 KG/M2

## 2018-12-11 DIAGNOSIS — F31.81 BIPOLAR 2 DISORDER (H): ICD-10-CM

## 2018-12-11 DIAGNOSIS — Z00.00 ROUTINE GENERAL MEDICAL EXAMINATION AT A HEALTH CARE FACILITY: Primary | ICD-10-CM

## 2018-12-11 PROCEDURE — 83721 ASSAY OF BLOOD LIPOPROTEIN: CPT | Mod: 59 | Performed by: NURSE PRACTITIONER

## 2018-12-11 PROCEDURE — 99396 PREV VISIT EST AGE 40-64: CPT | Performed by: NURSE PRACTITIONER

## 2018-12-11 PROCEDURE — 80061 LIPID PANEL: CPT | Performed by: NURSE PRACTITIONER

## 2018-12-11 PROCEDURE — 36415 COLL VENOUS BLD VENIPUNCTURE: CPT | Performed by: NURSE PRACTITIONER

## 2018-12-11 PROCEDURE — 80053 COMPREHEN METABOLIC PANEL: CPT | Performed by: NURSE PRACTITIONER

## 2018-12-11 RX ORDER — BUPROPION HYDROCHLORIDE 300 MG/1
300 TABLET ORAL EVERY MORNING
Qty: 90 TABLET | Refills: 3 | Status: SHIPPED | OUTPATIENT
Start: 2018-12-11

## 2018-12-11 RX ORDER — LAMOTRIGINE 200 MG/1
300 TABLET ORAL DAILY
Qty: 135 TABLET | Refills: 3 | Status: SHIPPED | OUTPATIENT
Start: 2018-12-11

## 2018-12-11 ASSESSMENT — ENCOUNTER SYMPTOMS
EYE PAIN: 0
ABDOMINAL PAIN: 0
SHORTNESS OF BREATH: 0
CHILLS: 0
HEADACHES: 0
ARTHRALGIAS: 0
CONSTIPATION: 0
PALPITATIONS: 0
NERVOUS/ANXIOUS: 0
HEARTBURN: 0
NAUSEA: 0
DYSURIA: 0
JOINT SWELLING: 0
DIARRHEA: 0
HEMATOCHEZIA: 0
FREQUENCY: 1
COUGH: 0
MYALGIAS: 0
PARESTHESIAS: 0
SORE THROAT: 0
WEAKNESS: 0
FEVER: 0
DIZZINESS: 0
HEMATURIA: 0

## 2018-12-11 ASSESSMENT — MIFFLIN-ST. JEOR: SCORE: 1931.38

## 2018-12-11 NOTE — PROGRESS NOTES
SUBJECTIVE:   CC: Shawn Weiler is an 43 year old male who presents for preventative health visit.     Physical   Annual:     Getting at least 3 servings of Calcium per day:  Yes    Bi-annual eye exam:  NO    Dental care twice a year:  Yes    Sleep apnea or symptoms of sleep apnea:  Excessive snoring    Diet:  Regular (no restrictions)    Frequency of exercise:  None    Taking medications regularly:  Yes    Medication side effects:  None    Additional concerns today:  No    PHQ-2 Total Score: 0        Today's PHQ-2 Score:   PHQ-2 ( 1999 [employer]) 12/11/2018   Q1: Little interest or pleasure in doing things 0   Q2: Feeling down, depressed or hopeless 0   PHQ-2 Score 0   Q1: Little interest or pleasure in doing things Not at all   Q2: Feeling down, depressed or hopeless Not at all   PHQ-2 Score 0       Abuse: Current or Past(Physical, Sexual or Emotional)- No  Do you feel safe in your environment? Yes    Social History     Tobacco Use     Smoking status: Never Smoker     Smokeless tobacco: Never Used   Substance Use Topics     Alcohol use: Yes     Comment: 10 week     Alcohol Use 12/11/2018   If you drink alcohol do you typically have greater than 3 drinks per day OR greater than 7 drinks per week? No   No flowsheet data found.    Last PSA: No results found for: PSA    Reviewed orders with patient. Reviewed health maintenance and updated orders accordingly - Yes  Labs reviewed in EPIC  BP Readings from Last 3 Encounters:   12/11/18 112/68   05/08/18 103/82   04/25/18 120/70    Wt Readings from Last 3 Encounters:   12/11/18 99.3 kg (219 lb)   04/25/18 98.4 kg (217 lb)   04/19/18 98.4 kg (217 lb)                  Patient Active Problem List   Diagnosis     Bipolar 2 disorder (H)     History of deviated nasal septum     History reviewed. No pertinent surgical history.    Social History     Tobacco Use     Smoking status: Never Smoker     Smokeless tobacco: Never Used   Substance Use Topics     Alcohol use: Yes     Comment:  10 week     History reviewed. No pertinent family history.      Current Outpatient Medications   Medication Sig Dispense Refill     buPROPion (WELLBUTRIN XL) 300 MG 24 hr tablet Take 1 tablet (300 mg) by mouth every morning 90 tablet 3     lamoTRIgine (LAMICTAL) 200 MG tablet Take 1.5 tablets (300 mg) by mouth daily 135 tablet 3     LAMOTRIGINE PO Take 300 mg by mouth       No Known Allergies  Recent Labs   Lab Test 08/15/17  1502   *   HDL 61   TRIG 230*   ALT 36   CR 0.96   GFRESTIMATED 86   GFRESTBLACK >90   POTASSIUM 4.0        Reviewed and updated as needed this visit by clinical staff  Tobacco  Allergies  Meds  Med Hx  Surg Hx  Fam Hx  Soc Hx        Reviewed and updated as needed this visit by Provider        Bipolar:  His mood has been steady/stable on wellbutrin/lamictal for 10 years.  He does not go to psychiatry.  He is requesting refills today.     Review of Systems   Constitutional: Negative for chills and fever.   HENT: Negative for congestion, ear pain, hearing loss and sore throat.    Eyes: Negative for pain and visual disturbance.   Respiratory: Negative for cough and shortness of breath.    Cardiovascular: Negative for chest pain, palpitations and peripheral edema.   Gastrointestinal: Negative for abdominal pain, constipation, diarrhea, heartburn, hematochezia and nausea.   Genitourinary: Positive for frequency. Negative for discharge, dysuria, genital sores, hematuria, impotence and urgency.   Musculoskeletal: Negative for arthralgias, joint swelling and myalgias.   Skin: Negative for rash.   Neurological: Negative for dizziness, weakness, headaches and paresthesias.   Psychiatric/Behavioral: Negative for mood changes. The patient is not nervous/anxious.        OBJECTIVE:   /68   Pulse 72   Temp 98.1  F (36.7  C) (Oral)   Resp 20   Ht 1.829 m (6')   Wt 99.3 kg (219 lb)   SpO2 98%   BMI 29.70 kg/m      Physical Exam  GENERAL: healthy, alert and no distress  EYES: Eyes  grossly normal to inspection, PERRL and conjunctivae and sclerae normal  HENT: ear canals and TM's normal, nose and mouth without ulcers or lesions  NECK: no adenopathy, no asymmetry, masses, or scars and thyroid normal to palpation  RESP: lungs clear to auscultation - no rales, rhonchi or wheezes  CV: regular rate and rhythm, normal S1 S2, no S3 or S4, no murmur, click or rub, no peripheral edema and peripheral pulses strong  ABDOMEN: soft, nontender, no hepatosplenomegaly, no masses and bowel sounds normal   (male): normal male genitalia without lesions or urethral discharge, no hernia  MS: no gross musculoskeletal defects noted, no edema  SKIN: no suspicious lesions or rashes  NEURO: Normal strength and tone, mentation intact and speech normal  PSYCH: mentation appears normal, affect normal/bright    Diagnostic Test Results:  Results pending    ASSESSMENT/PLAN:   (Z00.00) Routine general medical examination at a health care facility  (primary encounter diagnosis)  Comment:   Plan: Comprehensive metabolic panel, Lipid panel         reflex to direct LDL Fasting            (F31.81) Bipolar 2 disorder (H)  Comment:   Plan: buPROPion (WELLBUTRIN XL) 300 MG 24 hr tablet,         lamoTRIgine (LAMICTAL) 200 MG tablet,         Comprehensive metabolic panel        Continue meds as prscribed.  Yearly check in's with me for refills, sooner if concerns.      COUNSELING:   Reviewed preventive health counseling, as reflected in patient instructions    BP Readings from Last 1 Encounters:   12/11/18 112/68     Estimated body mass index is 29.7 kg/m  as calculated from the following:    Height as of this encounter: 1.829 m (6').    Weight as of this encounter: 99.3 kg (219 lb).      Weight management plan: Discussed healthy diet and exercise guidelines     reports that  has never smoked. he has never used smokeless tobacco.      Counseling Resources:  ATP IV Guidelines  Pooled Cohorts Equation Calculator  FRAX Risk  Assessment  ICSI Preventive Guidelines  Dietary Guidelines for Americans, 2010  USDA's MyPlate  ASA Prophylaxis  Lung CA Screening    ALEYDA Roberts CNP  [facility]

## 2018-12-12 LAB
ALBUMIN SERPL-MCNC: 4.1 G/DL (ref 3.4–5)
ALP SERPL-CCNC: 73 U/L (ref 40–150)
ALT SERPL W P-5'-P-CCNC: 40 U/L (ref 0–70)
ANION GAP SERPL CALCULATED.3IONS-SCNC: 8 MMOL/L (ref 3–14)
AST SERPL W P-5'-P-CCNC: 9 U/L (ref 0–45)
BILIRUB SERPL-MCNC: 0.4 MG/DL (ref 0.2–1.3)
BUN SERPL-MCNC: 16 MG/DL (ref 7–30)
CALCIUM SERPL-MCNC: 8.9 MG/DL (ref 8.5–10.1)
CHLORIDE SERPL-SCNC: 103 MMOL/L (ref 94–109)
CHOLEST SERPL-MCNC: 231 MG/DL
CO2 SERPL-SCNC: 25 MMOL/L (ref 20–32)
CREAT SERPL-MCNC: 1.16 MG/DL (ref 0.66–1.25)
GFR SERPL CREATININE-BSD FRML MDRD: 69 ML/MIN/1.7M2
GLUCOSE SERPL-MCNC: 106 MG/DL (ref 70–99)
HDLC SERPL-MCNC: 39 MG/DL
LDLC SERPL CALC-MCNC: ABNORMAL MG/DL
LDLC SERPL DIRECT ASSAY-MCNC: 136 MG/DL
NONHDLC SERPL-MCNC: 192 MG/DL
POTASSIUM SERPL-SCNC: 3.9 MMOL/L (ref 3.4–5.3)
PROT SERPL-MCNC: 6.9 G/DL (ref 6.8–8.8)
SODIUM SERPL-SCNC: 136 MMOL/L (ref 133–144)
TRIGL SERPL-MCNC: 437 MG/DL

## 2019-09-17 ENCOUNTER — TRANSFERRED RECORDS (OUTPATIENT)
Dept: HEALTH INFORMATION MANAGEMENT | Facility: CLINIC | Age: 44
End: 2019-09-17

## 2019-11-07 ENCOUNTER — ANCILLARY PROCEDURE (OUTPATIENT)
Dept: GENERAL RADIOLOGY | Facility: CLINIC | Age: 44
End: 2019-11-07
Attending: PREVENTIVE MEDICINE
Payer: COMMERCIAL

## 2019-11-07 ENCOUNTER — OFFICE VISIT (OUTPATIENT)
Dept: ORTHOPEDICS | Facility: CLINIC | Age: 44
End: 2019-11-07
Payer: COMMERCIAL

## 2019-11-07 VITALS
DIASTOLIC BLOOD PRESSURE: 78 MMHG | HEIGHT: 72 IN | SYSTOLIC BLOOD PRESSURE: 116 MMHG | HEART RATE: 73 BPM | WEIGHT: 220.5 LBS | OXYGEN SATURATION: 96 % | BODY MASS INDEX: 29.87 KG/M2

## 2019-11-07 DIAGNOSIS — M54.12 CERVICAL RADICULAR PAIN: ICD-10-CM

## 2019-11-07 DIAGNOSIS — M54.16 LUMBAR RADICULAR PAIN: Primary | ICD-10-CM

## 2019-11-07 DIAGNOSIS — M54.16 LUMBAR RADICULAR PAIN: ICD-10-CM

## 2019-11-07 PROCEDURE — 72040 X-RAY EXAM NECK SPINE 2-3 VW: CPT | Performed by: RADIOLOGY

## 2019-11-07 PROCEDURE — 99214 OFFICE O/P EST MOD 30 MIN: CPT | Performed by: PREVENTIVE MEDICINE

## 2019-11-07 PROCEDURE — 72100 X-RAY EXAM L-S SPINE 2/3 VWS: CPT | Performed by: RADIOLOGY

## 2019-11-07 RX ORDER — DICLOFENAC SODIUM 75 MG/1
75 TABLET, DELAYED RELEASE ORAL 2 TIMES DAILY
Qty: 60 TABLET | Refills: 1 | Status: SHIPPED | OUTPATIENT
Start: 2019-11-07 | End: 2020-01-22

## 2019-11-07 RX ORDER — DIPHENHYDRAMINE HCL 25 MG
25 TABLET ORAL
COMMUNITY
End: 2020-01-14

## 2019-11-07 ASSESSMENT — PAIN SCALES - GENERAL: PAINLEVEL: SEVERE PAIN (6)

## 2019-11-07 ASSESSMENT — MIFFLIN-ST. JEOR: SCORE: 1937.15

## 2019-11-07 NOTE — PROGRESS NOTES
HISTORY OF PRESENT ILLNESS  Mr. 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 and neck discomfort with more recent tingling in hands at times more often  Has a history of thoracic compression fracture(s)  Worried about health of his back  Has regular spasms in low back with the physical work that he does  Location: neck and low back  Quality:  achy pain    Severity: 5/10 at worst    Duration: months worse, has been on/off for years  Timing: occurs intermittently    Modifying factors:  resting and non-use makes it better, movement and use makes it worse  Associated signs & symptoms: tingling in both hands at times  Some radiation of pain into both legs  Additional history: as documented    MEDICAL HISTORY  Patient Active Problem List   Diagnosis     Bipolar 2 disorder (H)     History of deviated nasal septum       Current Outpatient Medications   Medication Sig Dispense Refill     buPROPion (WELLBUTRIN XL) 300 MG 24 hr tablet Take 1 tablet (300 mg) by mouth every morning 90 tablet 3     diphenhydrAMINE (BENADRYL) 25 MG tablet Take 25 mg by mouth nightly as needed for sleep       lamoTRIgine (LAMICTAL) 200 MG tablet Take 1.5 tablets (300 mg) by mouth daily 135 tablet 3     LAMOTRIGINE PO Take 300 mg by mouth         Allergies   Allergen Reactions     Milk [Lac Bovis] Anaphylaxis       No family history on file.    Additional medical/Social/Surgical histories reviewed in [facility] and updated as appropriate.     REVIEW OF SYSTEMS (11/7/2019)  10 point ROS of systems including Constitutional, Eyes, Respiratory, Cardiovascular, Gastroenterology, Genitourinary, Integumentary, Musculoskeletal, Psychiatric were all negative except for pertinent positives noted in my HPI.     PHYSICAL EXAM  Vitals:    11/07/19 1227   BP: 116/78   BP Location: Left arm   Patient Position: Sitting  "  Cuff Size: Adult Regular   Pulse: 73   SpO2: 96%   Weight: 100 kg (220 lb 8 oz)   Height: 1.835 m (6' 0.25\")     Vital Signs: /78 (BP Location: Left arm, Patient Position: Sitting, Cuff Size: Adult Regular)   Pulse 73   Ht 1.835 m (6' 0.25\")   Wt 100 kg (220 lb 8 oz)   SpO2 96%   BMI 29.70 kg/m   Patient declined being weighed. Body mass index is 29.7 kg/m .    General  - normal appearance, in no obvious distress  CV  - normal peripheral perfusion  Pulm  - normal respiratory pattern, non-labored  Musculoskeletal - lumbar spine  - stance: normal gait without limp, no obvious leg length discrepancy, normal heel and toe walk  - inspection: normal bone and joint alignment, no obvious scoliosis  - palpation: no paravertebral or bony tenderness  - ROM: flexion exacerbates some low back pain, normal extension, sidebending, rotation  - strength: lower extremities 5/5 in all planes  - special tests:  (-) straight leg raise  (-) slump test  Neuro  - patellar and Achilles DTRs 2+ bilaterally, no lower extremity sensory deficit throughout L5 distribution, grossly normal coordination, normal muscle tone  Skin  - no ecchymosis, erythema, warmth, or induration, no obvious rash  Psych  - interactive, appropriate, normal mood and affect  Cervical: has some pain with flexion and extension of neck, no radiating pain  5/5  strength today, no tingling or numbness currently in hands    ASSESSMENT & PLAN  45 yo male with cervical and lumbar discogenic pain, radicular pain  Reviewed xrays of cervical : shows ddd  Reviewed lumbar xrays: shows ddd, old compression fx at T12  Consider MRIs for both areas  Start PT  voltaren and tizanadine PRN  Given HEP  F/u 1 month    Phong Donahue MD, CAQSM  "

## 2019-11-07 NOTE — LETTER
11/7/2019         RE: Shawn Weiler  [address]  [place] [address]        Dear Colleague,    Thank you for referring your patient, Shawn Weiler, to the [facility]. Please see a copy of my visit note below.    HISTORY OF PRESENT ILLNESS  Mr. 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 and neck discomfort with more recent tingling in hands at times more often  Has a history of thoracic compression fracture(s)  Worried about health of his back  Has regular spasms in low back with the physical work that he does  Location: neck and low back  Quality:  achy pain    Severity: 5/10 at worst    Duration: months worse, has been on/off for years  Timing: occurs intermittently    Modifying factors:  resting and non-use makes it better, movement and use makes it worse  Associated signs & symptoms: tingling in both hands at times  Some radiation of pain into both legs  Additional history: as documented    MEDICAL HISTORY  Patient Active Problem List   Diagnosis     Bipolar 2 disorder (H)     History of deviated nasal septum       Current Outpatient Medications   Medication Sig Dispense Refill     buPROPion (WELLBUTRIN XL) 300 MG 24 hr tablet Take 1 tablet (300 mg) by mouth every morning 90 tablet 3     diphenhydrAMINE (BENADRYL) 25 MG tablet Take 25 mg by mouth nightly as needed for sleep       lamoTRIgine (LAMICTAL) 200 MG tablet Take 1.5 tablets (300 mg) by mouth daily 135 tablet 3     LAMOTRIGINE PO Take 300 mg by mouth         Allergies   Allergen Reactions     Milk [Lac Bovis] Anaphylaxis       No family history on file.    Additional medical/Social/Surgical histories reviewed in [facility] and updated as appropriate.     REVIEW OF SYSTEMS (11/7/2019)  10 point ROS of systems including Constitutional, Eyes, Respiratory, Cardiovascular, Gastroenterology,  "Genitourinary, Integumentary, Musculoskeletal, Psychiatric were all negative except for pertinent positives noted in my HPI.     PHYSICAL EXAM  Vitals:    11/07/19 1227   BP: 116/78   BP Location: Left arm   Patient Position: Sitting   Cuff Size: Adult Regular   Pulse: 73   SpO2: 96%   Weight: 100 kg (220 lb 8 oz)   Height: 1.835 m (6' 0.25\")     Vital Signs: /78 (BP Location: Left arm, Patient Position: Sitting, Cuff Size: Adult Regular)   Pulse 73   Ht 1.835 m (6' 0.25\")   Wt 100 kg (220 lb 8 oz)   SpO2 96%   BMI 29.70 kg/m    Patient declined being weighed. Body mass index is 29.7 kg/m .    General  - normal appearance, in no obvious distress  CV  - normal peripheral perfusion  Pulm  - normal respiratory pattern, non-labored  Musculoskeletal - lumbar spine  - stance: normal gait without limp, no obvious leg length discrepancy, normal heel and toe walk  - inspection: normal bone and joint alignment, no obvious scoliosis  - palpation: no paravertebral or bony tenderness  - ROM: flexion exacerbates some low back pain, normal extension, sidebending, rotation  - strength: lower extremities 5/5 in all planes  - special tests:  (-) straight leg raise  (-) slump test  Neuro  - patellar and Achilles DTRs 2+ bilaterally, no lower extremity sensory deficit throughout L5 distribution, grossly normal coordination, normal muscle tone  Skin  - no ecchymosis, erythema, warmth, or induration, no obvious rash  Psych  - interactive, appropriate, normal mood and affect  Cervical: has some pain with flexion and extension of neck, no radiating pain  5/5  strength today, no tingling or numbness currently in hands    ASSESSMENT & PLAN  43 yo male with cervical and lumbar discogenic pain, radicular pain  Reviewed xrays of cervical : shows ddd  Reviewed lumbar xrays: shows ddd, old compression fx at T12  Consider MRIs for both areas  Start PT  voltaren and tizanadine PRN  Given HEP  F/u 1 month    Phong Donahue MD, " CAM    Again, thank you for allowing me to participate in the care of your patient.        Sincerely,        Phong Donahue MD

## 2019-11-07 NOTE — NURSING NOTE
"      [facility]  11/7/2019    Shawn Weiler's chief complaint for this visit includes:  Chief Complaint   Patient presents with     Lower Back - Pain     Thoracic/lumbar pain     PCP: Gabby Minaya    Referring Provider:  No referring provider defined for this encounter.    /78 (BP Location: Left arm, Patient Position: Sitting, Cuff Size: Adult Regular)   Pulse 73   Ht 1.835 m (6' 0.25\")   Wt 100 kg (220 lb 8 oz)   SpO2 96%   BMI 29.70 kg/m          Reason for visit:     What part of your body is injured / painful?  Thoracic and low back    What caused the injury /pain? No inciting event (previous history of compression fractures 20 years ago)    How long ago did your injury occur or pain begin? several months ago - 6 months    What are your most bothersome symptoms? Pain and Numbness    How would you characterize your symptom?  aching and sharp    What makes your symptoms better? Heat and Ibuprofen and stretching    What makes your symptoms worse? Standing, Bending and Overhead motion    Have you been previously seen for this problem? Yes, ED [facility] 9/17/19    Medical History:    Any recent changes to your medical history? No    Any new medication prescribed since last visit? No    Have you had surgery on this body part before? No    Social History:    Occupation: window installation    Handedness: Right    Exercise: None    Review of Systems:    Do you have fever, chills, weight loss? No    Do you have any vision problems? No    Do you have any chest pain or edema? No    Do you have any shortness of breath or wheezing?  No    Do you have stomach problems? No    Do you have any numbness or focal weakness? Yes, numbness in bilateral hands upon waking    Do you have diabetes? No    Do you have problems with bleeding or clotting? No    Do you have an rashes or other skin lesions? No           "

## 2019-11-15 ENCOUNTER — THERAPY VISIT (OUTPATIENT)
Dept: PHYSICAL THERAPY | Facility: CLINIC | Age: 44
End: 2019-11-15
Attending: PREVENTIVE MEDICINE
Payer: COMMERCIAL

## 2019-11-15 DIAGNOSIS — G89.29 CHRONIC MIDLINE LOW BACK PAIN WITHOUT SCIATICA: ICD-10-CM

## 2019-11-15 DIAGNOSIS — M54.16 LUMBAR RADICULAR PAIN: ICD-10-CM

## 2019-11-15 DIAGNOSIS — M54.12 CERVICAL RADICULAR PAIN: ICD-10-CM

## 2019-11-15 DIAGNOSIS — M54.50 CHRONIC MIDLINE LOW BACK PAIN WITHOUT SCIATICA: ICD-10-CM

## 2019-11-15 DIAGNOSIS — M54.12 CERVICAL RADICULOPATHY: Primary | ICD-10-CM

## 2019-11-15 PROCEDURE — 97161 PT EVAL LOW COMPLEX 20 MIN: CPT | Mod: GP | Performed by: PHYSICAL THERAPIST

## 2019-11-15 PROCEDURE — 97110 THERAPEUTIC EXERCISES: CPT | Mod: GP | Performed by: PHYSICAL THERAPIST

## 2019-11-15 PROCEDURE — 97530 THERAPEUTIC ACTIVITIES: CPT | Mod: GP | Performed by: PHYSICAL THERAPIST

## 2019-11-15 NOTE — PROGRESS NOTES
[facility] Initial Evaluation -- Lumbar    Date: November 15, 2019  Shawn Weiler is a 44 year old male with a low back condition.   Referral: Dr. Donahue  Work mechanical stresses: install windows  Employment status:  Lifting, carrying, pushing, pulling, sawing, on ladders frequently  Leisure mechanical stresses: golfing.  yoga (has not been consistent for the past 4 years and would like to get back that)  Functional disability score (ELADIO/STarT Back):  14%, STart Back Medium (4), NDI 24%  VAS score (0-10): 0/10   Patient goals/expectations:  Alleviate neck, UE and low back hitch.  Teach home program to assist in management of the symptoms.  Would like to be able to return to yoga.  Drive without neck pain.  Sleep all night without waking due to pain.      HISTORY:    Present symptoms: moderate pain between shoulder blades.  Intermittent catch/hitch central lower back.    Pain quality (sharp/shooting/stabbing/aching/burning/cramping):  Sharp pain /between shoulder blades, no hitch currently   Paresthesia (yes/no):  Not for the legs/feet but wakes with the numbness in the hands and arms  Present since (onset date): MD appointment 11/7/2019 but did have a flare 4 weeks prior to MD appointment.       Symptoms (improving/unchanging/worsening):  worsening.     Symptoms commenced as a result of: He notes that overall his symptoms began after thoracic compression fractures  compression fracture when 20 years old jumping into a river and another compression fx in the thoracic area when skiing (25 years old).      Symptoms at onset (back/thigh/leg): back, neck, UE symptoms  Constant symptoms (back/thigh/leg): neck, thoracic back and bilateral upper extremity   Intermittent symptoms (back/thigh/leg): back hitch    Symptoms are made worse with the following: Always Bending, Always Rising, Time of day - No effect, Sometimes When still, Sometimes On the move and Other - Lifting and using arms overhead    Developed a hitch with hanging upside down and another time after a massage on 11/11/2019.    Symptoms are made better with the following: Sometimes Walking, Always Lying, Always When still, Sometimes On the move and Other - heating pad, hot bath, medication.  Has tried general stretches and will try them but not providing a lasting relief    Disturbed sleep (yes/no):  intermittently Sleeping postures (prone/sup/side R/L): sides    Previous episodes (0/1-5/6-10/11+): 11+ Year of first episode: 1995    Previous history: symptoms are now coming on more frequent and lasting longer, more intense  Previous treatments: OT for issues with gripping - without relief.  [id number] - acupuncture with some relief.  Had chiropractic care for his arms without any change in symptoms.  No formal therapy for the lower back.  He has been taking the Diclofenac this episode with some relief.       Specific Questions:  Cough/Sneeze/Strain (pos/neg): positive when symptoms are present  Bowel/Bladder (normal/abnormal): normal  Gait (normal/abnormal): normal  Medications (nil/NSAIDS/analg/steroids/anticoag/other): Wellbutrin, Diclofenac, Lamictal, Lamotrigine, Tizanidine.  Medical allergies:  milk  General health (excellent/good/fair/poor):  good  Pertinent medical history:  Mental illness, Sleep disorder/apnea and patient notes that his feet tend to be cold, no c/o numbness/tingling.  Numbness/tingling B hands (intermittent)  Imaging (None/Xray/MRI/Other):  X-rays  Recent or major surgery (yes/no):  none  Night pain (yes/no): may wake due to back pain  Accidents (yes/no): see above   Unexplained weight loss (yes/no): no  Barriers at home: no  Other red flags: no    EXAMINATION    Posture:   Sitting (good/fair/poor): poor  Standing (good/fair/poor):fair  Lordosis (red/acc/normal): normal  Correction of posture (better/worse/no effect): worse with prolonged use to support upper back and tried lower back    Lateral Shift (right/left/nil):  nil  Relevant (yes/no):  no  Other Observations:   Thoracic ROM - Pain with B thoracic Rotation ROM with symmetrical and full rotation, min loss thoracic extension.  CROM min loss lower cervical extension, min loss R rotation and nil loss L rotation, B lateral bending.     Neurological:    Motor deficit:  5/5 MMT for B UEs and B LEs    reflexes:  Symmetrical and brisk UE and LE reflexed  Sensory deficit:  Symmetrical to light touch for B UE and LEs    Dural signs:  Increased pulling on the R vs L with seated SLR.    Increased symptoms on the L with seated ULTT, more so than on R    Movement Loss:   Norberto Mod Min Nil Pain   Flexion    x none   Extension    x None/end range strain   Side Gliding R    x none   Side Gliding L    x none     Test Movements:   During: produces, abolishes, increases, decreases, no effect, centralizing, peripheralizing   After: better, worse, no better, no worse, no effect, centralized, peripheralized    Pretest symptoms standing: none (used seated SLR and ERP w EIS as baselines)   Symptoms During Symptoms After ROM increased ROM decreased No Effect   FIS          Rep FIS          EIS No Effect    No Effect         Rep EIS No Effect    No Effect         Pretest symptoms lying: (used seated SLR and ERP w EIS as baselines)    Symptoms During Symptoms After ROM increased ROM decreased No Effect   ORLANDO          Rep ORLANDO          EIL Produces    No Worse         Rep EIL Produces    No Worse    Dec tension with seated SLR     If required, pretest symptoms: cervical Repeated motions - neck/upper back 4/10   Symptoms During Symptoms After ROM increased ROM decreased No Effect    supine rep retraction No Effect    Better    Inc ROM dec pain thoracic rot     Supine repeated neck extension  No Effect    No Effect    No effect ROM, thoracic rot / tighter     Seated neck retraction  Int PDM    No Worse     pinch with R rot, tightness w thoracic rotation NE cervical rotation   Seated neck extension  Increases    No Worse     Dec neck rot, inc tightness thoracic region w thoracic rotation      Static Tests:  Sitting slouched:  NT  Sitting erect:  NT  Standing slouched NT  Standing erect:  NT  Lying prone in extension:  NT Long sitting:  NT    Other Tests: none    Provisional Classification:  Derangement - Bilateral, symmetrical, symptoms above knee and Inconclusive/Other - Mechanically Inconclusive cervical spine vs possible derangement    Principle of Management:  Education:  Discussed and demonstrated how to find neutral posture (slouch overcorrect).  Avoid slouched sitting for the neck and lower back.  Discussed monitoring symptoms before/after exercise and effect on symptoms, ROM, neural tension baselines.  Very brief discussion regarding centralization, do not allow symptoms to worsen in UE with neck retraction.       equipment provided:  Lumbar roll  - can try off and on but do not allow increased symptoms  Mechanical therapy (Y/N):  yes   Extension principle:  Supine neck retraction 10 reps, 6-8 times a day.  Lumbar extension in lying 10 reps per 6-8 x/day    Lateral Principle:  no  Flexion principle:  no    Other:  no    ASSESSMENT/PLAN:    Patient is a 44 year old male with cervical, thoracic and lumbar complaints.    Patient has the following significant findings with corresponding treatment plan.                Diagnosis 1:  Low back pain    Pain -  manual therapy, self management, education, directional preference exercise and home program  Decreased ROM/flexibility - manual therapy, therapeutic exercise and home program  Impaired muscle performance - neuro re-education and home program  Decreased function - therapeutic activities and home program  Impaired posture - neuro re-education, therapeutic activities and home program  Diagnosis 2:  Neck pain with B UE symptoms     Pain -  manual therapy, self management, education, directional preference exercise and home program  Decreased ROM/flexibility -  manual therapy, therapeutic exercise and home program  Decreased joint mobility - manual therapy, therapeutic exercise and home program  Impaired muscle performance - neuro re-education and home program  Decreased function - therapeutic activities and home program  Impaired posture - neuro re-education, therapeutic activities and home program    Therapy Evaluation Codes:   1) History comprised of:   Personal factors that impact the plan of care:      Time since onset of symptoms.    Comorbidity factors that impact the plan of care are:      None.     Medications impacting care: Wellbutrin, Diclofenac, Lamictal, Lamotrigine, Tizanidine.  2) Examination of Body Systems comprised of:   Body structures and functions that impact the plan of care:      Cervical spine, Lumbar spine and Thoracic Spine.   Activity limitations that impact the plan of care are:      Bending, Dressing, Lifting, Sitting, Standing, Working, Sleeping and reaching.  3) Clinical presentation characteristics are:   Evolving/Changing.  4) Decision-Making    Low complexity using standardized patient assessment instrument and/or measureable assessment of functional outcome.  Cumulative Therapy Evaluation is: Low complexity.    Previous and current functional limitations:  (See Goal Flow Sheet for this information)    Short term and Long term goals: (See Goal Flow Sheet for this information)     Communication ability:  Patient appears to be able to clearly communicate and understand verbal and written communication and follow directions correctly.  Treatment Explanation - The following has been discussed with the patient:   RX ordered/plan of care  Anticipated outcomes  Possible risks and side effects  This patient would benefit from PT intervention to resume normal activities.   Rehab potential is good.    Frequency:  1 X week, once daily  Duration:  for 12 weeks  Discharge Plan:  Achieve all LTG.  Independent in home treatment program.  Reach maximal  therapeutic benefit.    Please refer to the daily flowsheet for treatment today, total treatment time and time spent performing 1:1 timed codes.

## 2019-11-21 ENCOUNTER — THERAPY VISIT (OUTPATIENT)
Dept: PHYSICAL THERAPY | Facility: CLINIC | Age: 44
End: 2019-11-21
Payer: COMMERCIAL

## 2019-11-21 DIAGNOSIS — M54.12 CERVICAL RADICULOPATHY: ICD-10-CM

## 2019-11-21 DIAGNOSIS — M54.50 CHRONIC MIDLINE LOW BACK PAIN WITHOUT SCIATICA: ICD-10-CM

## 2019-11-21 DIAGNOSIS — G89.29 CHRONIC MIDLINE LOW BACK PAIN WITHOUT SCIATICA: ICD-10-CM

## 2019-11-21 PROCEDURE — 97110 THERAPEUTIC EXERCISES: CPT | Mod: GP | Performed by: PHYSICAL THERAPIST

## 2019-11-21 PROCEDURE — 97140 MANUAL THERAPY 1/> REGIONS: CPT | Mod: GP | Performed by: PHYSICAL THERAPIST

## 2019-11-25 ENCOUNTER — THERAPY VISIT (OUTPATIENT)
Dept: PHYSICAL THERAPY | Facility: CLINIC | Age: 44
End: 2019-11-25
Payer: COMMERCIAL

## 2019-11-25 DIAGNOSIS — M54.50 CHRONIC MIDLINE LOW BACK PAIN WITHOUT SCIATICA: ICD-10-CM

## 2019-11-25 DIAGNOSIS — M54.12 CERVICAL RADICULOPATHY: ICD-10-CM

## 2019-11-25 DIAGNOSIS — G89.29 CHRONIC MIDLINE LOW BACK PAIN WITHOUT SCIATICA: ICD-10-CM

## 2019-11-25 PROCEDURE — 97140 MANUAL THERAPY 1/> REGIONS: CPT | Mod: GP | Performed by: PHYSICAL THERAPY ASSISTANT

## 2019-11-25 PROCEDURE — 97110 THERAPEUTIC EXERCISES: CPT | Mod: GP | Performed by: PHYSICAL THERAPY ASSISTANT

## 2019-12-04 ENCOUNTER — THERAPY VISIT (OUTPATIENT)
Dept: PHYSICAL THERAPY | Facility: CLINIC | Age: 44
End: 2019-12-04
Payer: COMMERCIAL

## 2019-12-04 DIAGNOSIS — M54.50 CHRONIC MIDLINE LOW BACK PAIN WITHOUT SCIATICA: ICD-10-CM

## 2019-12-04 DIAGNOSIS — G89.29 CHRONIC MIDLINE LOW BACK PAIN WITHOUT SCIATICA: ICD-10-CM

## 2019-12-04 DIAGNOSIS — M54.12 CERVICAL RADICULOPATHY: ICD-10-CM

## 2019-12-04 PROCEDURE — 97110 THERAPEUTIC EXERCISES: CPT | Mod: GP | Performed by: PHYSICAL THERAPY ASSISTANT

## 2019-12-04 PROCEDURE — 97140 MANUAL THERAPY 1/> REGIONS: CPT | Mod: GP | Performed by: PHYSICAL THERAPY ASSISTANT

## 2019-12-11 ENCOUNTER — THERAPY VISIT (OUTPATIENT)
Dept: PHYSICAL THERAPY | Facility: CLINIC | Age: 44
End: 2019-12-11
Payer: COMMERCIAL

## 2019-12-11 DIAGNOSIS — M54.12 CERVICAL RADICULOPATHY: Primary | ICD-10-CM

## 2019-12-11 DIAGNOSIS — G89.29 CHRONIC MIDLINE LOW BACK PAIN WITHOUT SCIATICA: ICD-10-CM

## 2019-12-11 DIAGNOSIS — M54.50 CHRONIC MIDLINE LOW BACK PAIN WITHOUT SCIATICA: ICD-10-CM

## 2019-12-11 PROCEDURE — 97110 THERAPEUTIC EXERCISES: CPT | Mod: GP | Performed by: PHYSICAL THERAPY ASSISTANT

## 2019-12-11 PROCEDURE — 97140 MANUAL THERAPY 1/> REGIONS: CPT | Mod: GP | Performed by: PHYSICAL THERAPY ASSISTANT

## 2019-12-18 ENCOUNTER — THERAPY VISIT (OUTPATIENT)
Dept: PHYSICAL THERAPY | Facility: CLINIC | Age: 44
End: 2019-12-18
Payer: COMMERCIAL

## 2019-12-18 DIAGNOSIS — M54.12 CERVICAL RADICULOPATHY: Primary | ICD-10-CM

## 2019-12-18 DIAGNOSIS — G89.29 CHRONIC MIDLINE LOW BACK PAIN WITHOUT SCIATICA: ICD-10-CM

## 2019-12-18 DIAGNOSIS — M54.50 CHRONIC MIDLINE LOW BACK PAIN WITHOUT SCIATICA: ICD-10-CM

## 2019-12-18 PROCEDURE — 97110 THERAPEUTIC EXERCISES: CPT | Mod: GP | Performed by: PHYSICAL THERAPY ASSISTANT

## 2019-12-18 PROCEDURE — 97140 MANUAL THERAPY 1/> REGIONS: CPT | Mod: GP | Performed by: PHYSICAL THERAPY ASSISTANT

## 2020-01-14 ENCOUNTER — OFFICE VISIT (OUTPATIENT)
Dept: ORTHOPEDICS | Facility: CLINIC | Age: 45
End: 2020-01-14
Payer: COMMERCIAL

## 2020-01-14 VITALS
HEART RATE: 78 BPM | DIASTOLIC BLOOD PRESSURE: 79 MMHG | HEIGHT: 72 IN | SYSTOLIC BLOOD PRESSURE: 128 MMHG | WEIGHT: 220 LBS | BODY MASS INDEX: 29.8 KG/M2

## 2020-01-14 DIAGNOSIS — M62.830 LUMBAR PARASPINAL MUSCLE SPASM: ICD-10-CM

## 2020-01-14 DIAGNOSIS — M54.12 CERVICAL RADICULOPATHY: Primary | ICD-10-CM

## 2020-01-14 PROCEDURE — 99214 OFFICE O/P EST MOD 30 MIN: CPT | Performed by: PREVENTIVE MEDICINE

## 2020-01-14 ASSESSMENT — MIFFLIN-ST. JEOR: SCORE: 1934.88

## 2020-01-14 NOTE — LETTER
"    1/14/2020         RE: Shawn Weiler  [address]  [place] [address]        Dear Colleague,    Thank you for referring your patient, Shawn Weiler, to the [facility]. Please see a copy of my visit note below.    Shawn Weiler's chief complaint for this visit includes:  Chief Complaint   Patient presents with     Follow Up     Follow up for low back and neck pain. reporting physical therapy and reporting low back pain has decreased but cervical spine symptoms have not resolved.      PCP: Gabby Minaya    Referring Provider:  No referring provider defined for this encounter.    /79   Pulse 78   Ht 1.835 m (6' 0.25\")   Wt 99.8 kg (220 lb)   BMI 29.63 kg/m     Data Unavailable         HISTORY OF PRESENT ILLNESS  Mr. Weiler is a pleasant 44 year old year old male who presents to clinic today for followup for lumbar pain, improved with PT and cervical pain, which is still not resolved  Reviewed hsi response to PT and medications, and sleeping better  His neck still hurts and causes some radicular pain    MEDICAL HISTORY  Patient Active Problem List   Diagnosis     Bipolar 2 disorder (H)     History of deviated nasal septum     Cervical radiculopathy     Chronic midline low back pain without sciatica       Current Outpatient Medications   Medication Sig Dispense Refill     buPROPion (WELLBUTRIN XL) 300 MG 24 hr tablet Take 1 tablet (300 mg) by mouth every morning 90 tablet 3     diclofenac (VOLTAREN) 75 MG EC tablet Take 1 tablet (75 mg) by mouth 2 times daily 60 tablet 1     lamoTRIgine (LAMICTAL) 200 MG tablet Take 1.5 tablets (300 mg) by mouth daily 135 tablet 3     LAMOTRIGINE PO Take 300 mg by mouth       tiZANidine (ZANAFLEX) 4 MG tablet Take 1-2 tablets (4-8 mg) by mouth nightly as needed for muscle spasms 30 tablet 1       Allergies   Allergen Reactions     Milk [Lac Bovis] Anaphylaxis       No family history on file.    Additional medical/Social/Surgical histories reviewed " "in EPIC and updated as appropriate.     REVIEW OF SYSTEMS (1/14/2020)  10 point ROS of systems including Constitutional, Eyes, Respiratory, Cardiovascular, Gastroenterology, Genitourinary, Integumentary, Musculoskeletal, Psychiatric were all negative except for pertinent positives noted in my HPI.     PHYSICAL EXAM  Vitals:    01/14/20 1056   BP: 128/79   Pulse: 78   Weight: 99.8 kg (220 lb)   Height: 1.835 m (6' 0.25\")     Vital Signs: /79   Pulse 78   Ht 1.835 m (6' 0.25\")   Wt 99.8 kg (220 lb)   BMI 29.63 kg/m    Patient declined being weighed. Body mass index is 29.63 kg/m .    General  - normal appearance, in no obvious distress  CV  - normal radial pulse  Pulm  - normal respiratory pattern, non-labored  Musculoskeletal - neck  - inspection: normal bone and joint alignment, no obvious deformity, no scapular winging, no AC step-off  - palpation: no bony or soft tissue tenderness, except at base of paraspinal muscles of neck, normal clavicle, non-tender AC  - ROM:  Has full ROM of neck, with some pain with extension and positive spurlings  - strength: 5/5  strength, 5/5 in all shoulder planes    Neuro  - no sensory or motor deficit, grossly normal coordination, normal muscle tone  Skin  - no ecchymosis, erythema, warmth, or induration, no obvious rash  Psych  - interactive, appropriate, normal mood and affect      ASSESSMENT & PLAN  43 yo male with cervical disc herniation, radicular pain, ddd, not resolved, lumbar spasms, improved  Reviewed cont. Medications and PT exercises  Ordered cervical MRI due to ongoing pain and symptoms  F/u after    Phong Donahue MD, CAQSM    Again, thank you for allowing me to participate in the care of your patient.        Sincerely,        Phong Donahue MD    "

## 2020-01-14 NOTE — PROGRESS NOTES
"Shawn Weiler's chief complaint for this visit includes:  Chief Complaint   Patient presents with     Follow Up     Follow up for low back and neck pain. reporting physical therapy and reporting low back pain has decreased but cervical spine symptoms have not resolved.      PCP: Gabby Minaya    Referring Provider:  No referring provider defined for this encounter.    /79   Pulse 78   Ht 1.835 m (6' 0.25\")   Wt 99.8 kg (220 lb)   BMI 29.63 kg/m    Data Unavailable       "

## 2020-01-14 NOTE — PROGRESS NOTES
"HISTORY OF PRESENT ILLNESS  Mr. Weiler is a pleasant 44 year old year old male who presents to clinic today for followup for lumbar pain, improved with PT and cervical pain, which is still not resolved  Reviewed hsi response to PT and medications, and sleeping better  His neck still hurts and causes some radicular pain    MEDICAL HISTORY  Patient Active Problem List   Diagnosis     Bipolar 2 disorder (H)     History of deviated nasal septum     Cervical radiculopathy     Chronic midline low back pain without sciatica       Current Outpatient Medications   Medication Sig Dispense Refill     buPROPion (WELLBUTRIN XL) 300 MG 24 hr tablet Take 1 tablet (300 mg) by mouth every morning 90 tablet 3     diclofenac (VOLTAREN) 75 MG EC tablet Take 1 tablet (75 mg) by mouth 2 times daily 60 tablet 1     lamoTRIgine (LAMICTAL) 200 MG tablet Take 1.5 tablets (300 mg) by mouth daily 135 tablet 3     LAMOTRIGINE PO Take 300 mg by mouth       tiZANidine (ZANAFLEX) 4 MG tablet Take 1-2 tablets (4-8 mg) by mouth nightly as needed for muscle spasms 30 tablet 1       Allergies   Allergen Reactions     Milk [Lac Bovis] Anaphylaxis       No family history on file.    Additional medical/Social/Surgical histories reviewed in [facility] and updated as appropriate.     REVIEW OF SYSTEMS (1/14/2020)  10 point ROS of systems including Constitutional, Eyes, Respiratory, Cardiovascular, Gastroenterology, Genitourinary, Integumentary, Musculoskeletal, Psychiatric were all negative except for pertinent positives noted in my HPI.     PHYSICAL EXAM  Vitals:    01/14/20 1056   BP: 128/79   Pulse: 78   Weight: 99.8 kg (220 lb)   Height: 1.835 m (6' 0.25\")     Vital Signs: /79   Pulse 78   Ht 1.835 m (6' 0.25\")   Wt 99.8 kg (220 lb)   BMI 29.63 kg/m   Patient declined being weighed. Body mass index is 29.63 kg/m .    General  - normal appearance, in no obvious distress  CV  - normal radial pulse  Pulm  - normal respiratory pattern, " non-labored  Musculoskeletal - neck  - inspection: normal bone and joint alignment, no obvious deformity, no scapular winging, no AC step-off  - palpation: no bony or soft tissue tenderness, except at base of paraspinal muscles of neck, normal clavicle, non-tender AC  - ROM:  Has full ROM of neck, with some pain with extension and positive spurlings  - strength: 5/5  strength, 5/5 in all shoulder planes    Neuro  - no sensory or motor deficit, grossly normal coordination, normal muscle tone  Skin  - no ecchymosis, erythema, warmth, or induration, no obvious rash  Psych  - interactive, appropriate, normal mood and affect      ASSESSMENT & PLAN  45 yo male with cervical disc herniation, radicular pain, ddd, not resolved, lumbar spasms, improved  Reviewed cont. Medications and PT exercises  Ordered cervical MRI due to ongoing pain and symptoms  F/u after    Phong Donahue MD, CAQSM

## 2020-01-14 NOTE — PATIENT INSTRUCTIONS
Thanks for coming today.  [facility]  [address] [place] [address]    To schedule future appointments in Ortho Clinic, you may call [phone].    To schedule ordered imaging by your provider:   Call [facility] [phone]    To schedule an injection ordered by your provider:  Call [facility] Injection scheduling line: [phone]  [email or web address] available online at:  [email or web address]    Please call if any further questions or concerns ([phone]).  Clinic hours 8 am to 5 pm.    Return to clinic (call) if symptoms worsen or fail to improve.

## 2020-01-20 ENCOUNTER — ANCILLARY PROCEDURE (OUTPATIENT)
Dept: MRI IMAGING | Facility: CLINIC | Age: 45
End: 2020-01-20
Attending: PREVENTIVE MEDICINE
Payer: COMMERCIAL

## 2020-01-20 DIAGNOSIS — M54.12 CERVICAL RADICULOPATHY: ICD-10-CM

## 2020-01-20 PROCEDURE — 72141 MRI NECK SPINE W/O DYE: CPT | Performed by: RADIOLOGY

## 2020-01-22 DIAGNOSIS — M54.16 LUMBAR RADICULAR PAIN: ICD-10-CM

## 2020-01-22 DIAGNOSIS — M54.12 CERVICAL RADICULAR PAIN: ICD-10-CM

## 2020-01-22 RX ORDER — DICLOFENAC SODIUM 75 MG/1
TABLET, DELAYED RELEASE ORAL
Qty: 60 TABLET | Refills: 1 | Status: SHIPPED | OUTPATIENT
Start: 2020-01-22 | End: 2020-02-27

## 2020-02-10 NOTE — PROGRESS NOTES
Discharge Note    Progress reporting period is from initial evaluation date (please see noted date below) to Dec 18, 2019.  No linked episodes      Carlos failed to follow up and current status is unknown.  Please see information below for last relevant information on current status.  Patient seen for 6 visits.    SUBJECTIVE  Subjective changes noted by patient:  Patient reports neck/shoulder get more sore with sleeping. Wonders if bed is causing more pain.  Did notice massage was helpful. Wasn't able to do isometrics due to time/school.   Low back is doing good and able to manage symptoms on own.   .  Current pain level is 4/10.     Previous pain level was  4/10.   Changes in function:  Yes (See Goal flowsheet attached for changes in current functional level)  Adverse reaction to treatment or activity: None    OBJECTIVE  Changes noted in objective findings: CROM R rot 79; L rot 78 tightness no pain     ASSESSMENT/PLAN  Diagnosis: cervical radiculopathy, low back pain (hitch), upper back tightness   Updated problem list and treatment plan:   Pain - HEP  Decreased ROM/flexibility - HEP  Decreased function - HEP  STG/LTGs have been met or progress has been made towards goals:  Yes, please see goal flowsheet for most current information  Assessment of Progress: current status is unknown.    Last current status: Pt is progressing as expected   Self Management Plans:  HEP  I have re-evaluated this patient and find that the nature, scope, duration and intensity of the therapy is appropriate for the medical condition of the patient.  Carlos continues to require the following intervention to meet STG and LTG's:  HEP.    Recommendations:  Discharge with current home program.  Patient to follow up with MD as needed.    Please refer to the daily flowsheet for treatment today, total treatment time and time spent performing 1:1 timed codes.

## 2020-02-24 ENCOUNTER — TELEPHONE (OUTPATIENT)
Dept: ORTHOPEDICS | Facility: CLINIC | Age: 45
End: 2020-02-24

## 2020-02-24 NOTE — TELEPHONE ENCOUNTER
Per Dr. Donahue, he would like to see patient back in clinic to discuss results.     Called patient and helped him schedule a follow up.     All questions answered.

## 2020-02-24 NOTE — TELEPHONE ENCOUNTER
[facility] Call Center    Phone Message    May a detailed message be left on voicemail: yes     Reason for Call: Other: Pt is looking to discuss results of MRI on 1/20. Please advise.

## 2020-02-26 ENCOUNTER — OFFICE VISIT (OUTPATIENT)
Dept: ORTHOPEDICS | Facility: CLINIC | Age: 45
End: 2020-02-26
Payer: COMMERCIAL

## 2020-02-26 VITALS
HEIGHT: 72 IN | WEIGHT: 220 LBS | SYSTOLIC BLOOD PRESSURE: 129 MMHG | DIASTOLIC BLOOD PRESSURE: 82 MMHG | BODY MASS INDEX: 29.8 KG/M2 | HEART RATE: 72 BPM

## 2020-02-26 DIAGNOSIS — M54.12 CERVICAL RADICULOPATHY: Primary | ICD-10-CM

## 2020-02-26 DIAGNOSIS — M50.20 CERVICAL HERNIATED DISC: ICD-10-CM

## 2020-02-26 PROCEDURE — 99214 OFFICE O/P EST MOD 30 MIN: CPT | Performed by: PREVENTIVE MEDICINE

## 2020-02-26 ASSESSMENT — MIFFLIN-ST. JEOR: SCORE: 1934.88

## 2020-02-26 NOTE — LETTER
2/26/2020         RE: Shawn Weiler  [address]  [place] [address]        Dear Colleague,    Thank you for referring your patient, Shawn Weiler, to the [facility], [place]. Please see a copy of my visit note below.    HISTORY OF PRESENT ILLNESS  Mr. Weiler is a pleasant 44 year old year old male who presents to clinic today returns to review his cervical MRI  Doing a little better with PT and chiropractic and medications, but still having pain in neck and tingling in right arm    MEDICAL HISTORY  Patient Active Problem List   Diagnosis     Bipolar 2 disorder (H)     History of deviated nasal septum     Cervical radiculopathy     Chronic midline low back pain without sciatica       Current Outpatient Medications   Medication Sig Dispense Refill     buPROPion (WELLBUTRIN XL) 300 MG 24 hr tablet Take 1 tablet (300 mg) by mouth every morning 90 tablet 3     diclofenac (VOLTAREN) 75 MG EC tablet TAKE 1 TABLET(75 MG) BY MOUTH TWICE DAILY 60 tablet 1     lamoTRIgine (LAMICTAL) 200 MG tablet Take 1.5 tablets (300 mg) by mouth daily 135 tablet 3     LAMOTRIGINE PO Take 300 mg by mouth       tiZANidine (ZANAFLEX) 4 MG tablet TAKE 1 TO 2 TABLETS(4 TO 8 MG) BY MOUTH EVERY NIGHT AS NEEDED FOR MUSCLE SPASMS 30 tablet 1       Allergies   Allergen Reactions     Milk [Lac Bovis] Anaphylaxis       No family history on file.  Social History     Socioeconomic History     Marital status:      Spouse name: Not on file     Number of children: Not on file     Years of education: Not on file     Highest education level: Not on file   Occupational History     Not on file   Social Needs     Financial resource strain: Not on file     Food insecurity:     Worry: Not on file     Inability: Not on file     Transportation needs:     Medical: Not on file     Non-medical: Not on file   Tobacco Use     Smoking status: Never Smoker     Smokeless tobacco: Never Used   Substance and Sexual Activity     Alcohol use: Yes      "Comment: 10 week     Drug use: No     Sexual activity: Yes     Partners: Female     Birth control/protection: Pill   Lifestyle     Physical activity:     Days per week: Not on file     Minutes per session: Not on file     Stress: Not on file   Relationships     Social connections:     Talks on phone: Not on file     Gets together: Not on file     Attends Holiness service: Not on file     Active member of club or organization: Not on file     Attends meetings of clubs or organizations: Not on file     Relationship status: Not on file     Intimate partner violence:     Fear of current or ex partner: Not on file     Emotionally abused: Not on file     Physically abused: Not on file     Forced sexual activity: Not on file   Other Topics Concern     Parent/sibling w/ CABG, MI or angioplasty before 65F 55M? No   Social History Narrative     Not on file       Additional medical/Social/Surgical histories reviewed in [facility] and updated as appropriate.     REVIEW OF SYSTEMS (2/26/2020)  10 point ROS of systems including Constitutional, Eyes, Respiratory, Cardiovascular, Gastroenterology, Genitourinary, Integumentary, Musculoskeletal, Psychiatric, Allergic/Immunologic were all negative except for pertinent positives noted in my HPI.     PHYSICAL EXAM  Vitals:    02/26/20 1614   BP: 129/82   Pulse: 72   Weight: 99.8 kg (220 lb)   Height: 1.835 m (6' 0.25\")   Vital Signs: /82   Pulse 72   Ht 1.835 m (6' 0.25\")   Wt 99.8 kg (220 lb)   BMI 29.63 kg/m    Patient declined being weighed. Body mass index is 29.63 kg/m .  General  - normal appearance, in no obvious distress  CV  - normal radial pulse  Pulm  - normal respiratory pattern, non-labored  Musculoskeletal - neck  - inspection: normal bone and joint alignment, no obvious deformity, no scapular winging, no AC step-off  - palpation: no bony or soft tissue tenderness, normal clavicle, non-tender AC  - ROM:  Some limited flexion in neck with discomfort at base of neck and " upper back  - strength: 5/5  strength, 5/5 in all shoulder planes    Neuro  - no sensory or motor deficit, grossly normal coordination, normal muscle tone  Skin  - no ecchymosis, erythema, warmth, or induration, no obvious rash  Psych  - interactive, appropriate, normal mood and affect      ASSESSMENT & PLAN  45 yo male with cervical herniated discs  Reviewed cervical MRI: shows herniated discs  Cont. PT  Cont. Medications  Ordered cervical JADEN  F/u in 3-4 weeks  Phong Donahue MD, CAQSM    Again, thank you for allowing me to participate in the care of your patient.        Sincerely,        Phong Donahue MD

## 2020-02-26 NOTE — PATIENT INSTRUCTIONS
Thanks for coming today.  [facility]  [address] [place] [address]    To schedule future appointments in Ortho Clinic, you may call [phone].    To schedule ordered imaging by your provider:   Call [facility] [phone]    To schedule an injection ordered by your provider:  Call [facility] Injection scheduling line: [phone]  [facility] available online at:  [email or web address]    Please call if any further questions or concerns ([phone]).  Clinic hours 8 am to 5 pm.    Return to clinic (call) if symptoms worsen or fail to improve.

## 2020-02-26 NOTE — PROGRESS NOTES
HISTORY OF PRESENT ILLNESS  Mr. Weiler is a pleasant 44 year old year old male who presents to clinic today returns to review his cervical MRI  Doing a little better with PT and chiropractic and medications, but still having pain in neck and tingling in right arm    MEDICAL HISTORY  Patient Active Problem List   Diagnosis     Bipolar 2 disorder (H)     History of deviated nasal septum     Cervical radiculopathy     Chronic midline low back pain without sciatica       Current Outpatient Medications   Medication Sig Dispense Refill     buPROPion (WELLBUTRIN XL) 300 MG 24 hr tablet Take 1 tablet (300 mg) by mouth every morning 90 tablet 3     diclofenac (VOLTAREN) 75 MG EC tablet TAKE 1 TABLET(75 MG) BY MOUTH TWICE DAILY 60 tablet 1     lamoTRIgine (LAMICTAL) 200 MG tablet Take 1.5 tablets (300 mg) by mouth daily 135 tablet 3     LAMOTRIGINE PO Take 300 mg by mouth       tiZANidine (ZANAFLEX) 4 MG tablet TAKE 1 TO 2 TABLETS(4 TO 8 MG) BY MOUTH EVERY NIGHT AS NEEDED FOR MUSCLE SPASMS 30 tablet 1       Allergies   Allergen Reactions     Milk [Lac Bovis] Anaphylaxis       No family history on file.  Social History     Socioeconomic History     Marital status:      Spouse name: Not on file     Number of children: Not on file     Years of education: Not on file     Highest education level: Not on file   Occupational History     Not on file   Social Needs     Financial resource strain: Not on file     Food insecurity:     Worry: Not on file     Inability: Not on file     Transportation needs:     Medical: Not on file     Non-medical: Not on file   Tobacco Use     Smoking status: Never Smoker     Smokeless tobacco: Never Used   Substance and Sexual Activity     Alcohol use: Yes     Comment: 10 week     Drug use: No     Sexual activity: Yes     Partners: Female     Birth control/protection: Pill   Lifestyle     Physical activity:     Days per week: Not on file     Minutes per session: Not on file     Stress: Not on file  "  Relationships     Social connections:     Talks on phone: Not on file     Gets together: Not on file     Attends Cheondoism service: Not on file     Active member of club or organization: Not on file     Attends meetings of clubs or organizations: Not on file     Relationship status: Not on file     Intimate partner violence:     Fear of current or ex partner: Not on file     Emotionally abused: Not on file     Physically abused: Not on file     Forced sexual activity: Not on file   Other Topics Concern     Parent/sibling w/ CABG, MI or angioplasty before 65F 55M? No   Social History Narrative     Not on file       Additional medical/Social/Surgical histories reviewed in [facility] and updated as appropriate.     REVIEW OF SYSTEMS (2/26/2020)  10 point ROS of systems including Constitutional, Eyes, Respiratory, Cardiovascular, Gastroenterology, Genitourinary, Integumentary, Musculoskeletal, Psychiatric, Allergic/Immunologic were all negative except for pertinent positives noted in my HPI.     PHYSICAL EXAM  Vitals:    02/26/20 1614   BP: 129/82   Pulse: 72   Weight: 99.8 kg (220 lb)   Height: 1.835 m (6' 0.25\")   Vital Signs: /82   Pulse 72   Ht 1.835 m (6' 0.25\")   Wt 99.8 kg (220 lb)   BMI 29.63 kg/m   Patient declined being weighed. Body mass index is 29.63 kg/m .  General  - normal appearance, in no obvious distress  CV  - normal radial pulse  Pulm  - normal respiratory pattern, non-labored  Musculoskeletal - neck  - inspection: normal bone and joint alignment, no obvious deformity, no scapular winging, no AC step-off  - palpation: no bony or soft tissue tenderness, normal clavicle, non-tender AC  - ROM:  Some limited flexion in neck with discomfort at base of neck and upper back  - strength: 5/5  strength, 5/5 in all shoulder planes    Neuro  - no sensory or motor deficit, grossly normal coordination, normal muscle tone  Skin  - no ecchymosis, erythema, warmth, or induration, no obvious rash  Psych  - " interactive, appropriate, normal mood and affect      ASSESSMENT & PLAN  45 yo male with cervical herniated discs  Reviewed cervical MRI: shows herniated discs  Cont. PT  Cont. Medications  Ordered cervical JADEN  F/u in 3-4 weeks  Phong Donahue MD, CAQSM

## 2020-02-27 RX ORDER — DEXTROSE MONOHYDRATE 25 G/50ML
25-50 INJECTION, SOLUTION INTRAVENOUS
Status: CANCELLED | OUTPATIENT
Start: 2020-02-27

## 2020-02-27 RX ORDER — NICOTINE POLACRILEX 4 MG
15-30 LOZENGE BUCCAL
Status: CANCELLED | OUTPATIENT
Start: 2020-02-27

## 2020-02-27 RX ORDER — DIAZEPAM 5 MG
5 TABLET ORAL
Status: ON HOLD | COMMUNITY
Start: 2019-09-17 | End: 2020-03-03

## 2020-03-03 ENCOUNTER — HOSPITAL ENCOUNTER (OUTPATIENT)
Dept: GENERAL RADIOLOGY | Facility: CLINIC | Age: 45
End: 2020-03-03
Attending: PREVENTIVE MEDICINE
Payer: COMMERCIAL

## 2020-03-03 ENCOUNTER — HOSPITAL ENCOUNTER (OUTPATIENT)
Facility: CLINIC | Age: 45
Discharge: HOME | End: 2020-03-03
Admitting: PREVENTIVE MEDICINE
Payer: COMMERCIAL

## 2020-03-03 VITALS
DIASTOLIC BLOOD PRESSURE: 82 MMHG | HEART RATE: 80 BPM | SYSTOLIC BLOOD PRESSURE: 137 MMHG | BODY MASS INDEX: 29.8 KG/M2 | OXYGEN SATURATION: 97 % | TEMPERATURE: 98.5 F | HEIGHT: 72 IN | WEIGHT: 220 LBS | RESPIRATION RATE: 16 BRPM

## 2020-03-03 DIAGNOSIS — M54.12 CERVICAL RADICULOPATHY: ICD-10-CM

## 2020-03-03 DIAGNOSIS — M50.20 CERVICAL HERNIATED DISC: ICD-10-CM

## 2020-03-03 PROCEDURE — 62321 NJX INTERLAMINAR CRV/THRC: CPT

## 2020-03-03 PROCEDURE — 25500064 ZZH RX 255 OP 636: Performed by: PREVENTIVE MEDICINE

## 2020-03-03 PROCEDURE — 40000863 ZZH STATISTIC RADIOLOGY XRAY, US, CT, MAR, NM

## 2020-03-03 PROCEDURE — 25000128 H RX IP 250 OP 636: Performed by: PREVENTIVE MEDICINE

## 2020-03-03 PROCEDURE — 25000125 ZZHC RX 250: Performed by: PREVENTIVE MEDICINE

## 2020-03-03 RX ORDER — IOPAMIDOL 408 MG/ML
10 INJECTION, SOLUTION INTRATHECAL ONCE
Status: COMPLETED | OUTPATIENT
Start: 2020-03-03 | End: 2020-03-03

## 2020-03-03 RX ORDER — DEXTROSE MONOHYDRATE 25 G/50ML
25-50 INJECTION, SOLUTION INTRAVENOUS
Status: CANCELLED | OUTPATIENT
Start: 2020-03-03

## 2020-03-03 RX ORDER — DEXAMETHASONE SODIUM PHOSPHATE 10 MG/ML
10 INJECTION, SOLUTION INTRAMUSCULAR; INTRAVENOUS ONCE
Status: COMPLETED | OUTPATIENT
Start: 2020-03-03 | End: 2020-03-03

## 2020-03-03 RX ORDER — LIDOCAINE HYDROCHLORIDE 10 MG/ML
30 INJECTION, SOLUTION EPIDURAL; INFILTRATION; INTRACAUDAL; PERINEURAL ONCE
Status: COMPLETED | OUTPATIENT
Start: 2020-03-03 | End: 2020-03-03

## 2020-03-03 RX ORDER — DEXTROSE MONOHYDRATE 25 G/50ML
25-50 INJECTION, SOLUTION INTRAVENOUS
Status: DISCONTINUED | OUTPATIENT
Start: 2020-03-03 | End: 2020-03-03 | Stop reason: HOSPADM

## 2020-03-03 RX ORDER — NICOTINE POLACRILEX 4 MG
15-30 LOZENGE BUCCAL
Status: DISCONTINUED | OUTPATIENT
Start: 2020-03-03 | End: 2020-03-03 | Stop reason: HOSPADM

## 2020-03-03 RX ORDER — NICOTINE POLACRILEX 4 MG
15-30 LOZENGE BUCCAL
Status: CANCELLED | OUTPATIENT
Start: 2020-03-03

## 2020-03-03 RX ADMIN — IOPAMIDOL 1 ML: 408 INJECTION, SOLUTION INTRATHECAL at 14:49

## 2020-03-03 RX ADMIN — LIDOCAINE HYDROCHLORIDE 3 ML: 10 INJECTION, SOLUTION EPIDURAL; INFILTRATION; INTRACAUDAL; PERINEURAL at 14:48

## 2020-03-03 RX ADMIN — DEXAMETHASONE SODIUM PHOSPHATE 20 MG: 10 INJECTION, SOLUTION INTRAMUSCULAR; INTRAVENOUS at 14:49

## 2020-03-03 ASSESSMENT — MIFFLIN-ST. JEOR: SCORE: 1930.91

## 2020-03-03 NOTE — DISCHARGE INSTRUCTIONS
Steroid Injection Discharge Instructions     After you go home:      You may resume your normal diet.    Care of Puncture Site:      If you have a bandaid on your puncture site, you may remove it the next morning    You may shower tomorrow    No bath tubs, whirlpools or swimming for at least 3 days     Activity:      You may go back to normal activity in 24 hours    You should let pain be your guide as to the extent of your activities    Maintain any activity limitations as ordered by your provider    Do NOT drive a vehicle if you develop numbness in your arm or leg    Medicines:      You may resume all medications    Resume your Warfarin/Coumadin at your regular dose today. Follow up with your provider to have your INR rechecked    Resume your Platelet Inhibitors and Aspirin tomorrow at your regular dose    For minor pain, you may take Acetaminophen (Tylenol) or Ibuprofen (Advil)    Pain:       You may experience increased or different pain over the next 24-48 hours    For the next 48 hrs - you may use ice packs for discomfort     Call your primary care doctor if:      You have severe pain that does not improve with pain medication    You have chills or a fever greater than 101 F (38 C)    The site is red, swollen, hot or tender    New problems with your bowel or bladder    Any questions or concerns    Other Instructions:      New numbness down your leg post injection is temporary and may last for up to 6 hours. You may need assistance with activity until your leg has normal sensation.    If you are diabetic, monitor your blood sugar closely. Contact the provider who manages your diabetes to help you control your blood sugar if needed.    For Your Information:      A steroid was injected to help decrease swelling and may help to reduce pain. It may take up to 7-10 days to obtain full results.    Some patients will get lasting relief from a single injection. Others may require up to 3 injections to get results. If  you have more than one steroid injection, they should be given 2 weeks apart.    Side effects of your steroid injection are mild and will go away in 2-3 days  - Insomnia  - Heartburn  - Flushed face  - Water retention  - Increased appetite  - Increased blood sugar      If you have questions call:        [facility] Radiology Dept @ [phone]      The provider who performed your procedure was __Michael Dumont____.

## 2020-03-03 NOTE — PROGRESS NOTES
1455 Pt returned from radiology. Bandaid CDI to low posterior neck. Pt denies pain at this time.   1500 OOB - steady on feet. Ambulates w/o difficulty.   1515 Pt discharged per ambulatory to private vehicle. All personal belongings taken with pt.

## 2020-03-03 NOTE — PROGRESS NOTES
Care Suites Admission Nursing Note    Patient Information  Name: Shawn Weiler  Age: 44 year old  Reason for admission: epidural injection  Care Suites arrival time: 1355    Patient Admission/Assessment   Pre-procedure assessment complete: Yes  If abnormal assessment/labs, provider notified: N/A  NPO: Yes  Medications held per instructions/orders: Yes  Consent: deferred  Patient oriented to room: Yes  Education/questions answered: Yes      Discharge Planning  Accompanied by: carlos  Discharge name/phone number: [phone]  Overnight post sedation caregiver: yes  Discharge location: home    Marilu Nichols RN

## 2020-03-09 PROBLEM — G89.29 CHRONIC MIDLINE LOW BACK PAIN WITHOUT SCIATICA: Status: RESOLVED | Noted: 2019-11-15 | Resolved: 2020-03-09

## 2020-03-09 PROBLEM — M54.12 CERVICAL RADICULOPATHY: Status: RESOLVED | Noted: 2019-11-15 | Resolved: 2020-03-09

## 2020-03-09 PROBLEM — M54.50 CHRONIC MIDLINE LOW BACK PAIN WITHOUT SCIATICA: Status: RESOLVED | Noted: 2019-11-15 | Resolved: 2020-03-09

## 2020-08-27 ENCOUNTER — TRANSFERRED RECORDS (OUTPATIENT)
Dept: HEALTH INFORMATION MANAGEMENT | Facility: CLINIC | Age: 45
End: 2020-08-27

## 2022-08-31 NOTE — PROGRESS NOTES
Patient verbalizes understanding of discharge instructions and follow up.  No further questions at this time.    no

## (undated) RX ORDER — DEXAMETHASONE SODIUM PHOSPHATE 10 MG/ML
INJECTION, SOLUTION INTRAMUSCULAR; INTRAVENOUS
Status: DISPENSED
Start: 2020-03-03

## (undated) RX ORDER — LIDOCAINE HYDROCHLORIDE 10 MG/ML
INJECTION, SOLUTION EPIDURAL; INFILTRATION; INTRACAUDAL; PERINEURAL
Status: DISPENSED
Start: 2020-03-03